# Patient Record
Sex: FEMALE | Race: WHITE | NOT HISPANIC OR LATINO | Employment: PART TIME | URBAN - METROPOLITAN AREA
[De-identification: names, ages, dates, MRNs, and addresses within clinical notes are randomized per-mention and may not be internally consistent; named-entity substitution may affect disease eponyms.]

---

## 2020-04-01 ENCOUNTER — HOSPITAL ENCOUNTER (INPATIENT)
Facility: MEDICAL CENTER | Age: 75
LOS: 3 days | DRG: 559 | End: 2020-04-04
Attending: EMERGENCY MEDICINE | Admitting: INTERNAL MEDICINE
Payer: MEDICARE

## 2020-04-01 ENCOUNTER — APPOINTMENT (OUTPATIENT)
Dept: RADIOLOGY | Facility: MEDICAL CENTER | Age: 75
DRG: 559 | End: 2020-04-01
Attending: INTERNAL MEDICINE
Payer: MEDICARE

## 2020-04-01 ENCOUNTER — APPOINTMENT (OUTPATIENT)
Dept: RADIOLOGY | Facility: MEDICAL CENTER | Age: 75
DRG: 559 | End: 2020-04-01
Attending: STUDENT IN AN ORGANIZED HEALTH CARE EDUCATION/TRAINING PROGRAM
Payer: MEDICARE

## 2020-04-01 ENCOUNTER — APPOINTMENT (OUTPATIENT)
Dept: RADIOLOGY | Facility: MEDICAL CENTER | Age: 75
DRG: 559 | End: 2020-04-01
Attending: EMERGENCY MEDICINE
Payer: MEDICARE

## 2020-04-01 ENCOUNTER — APPOINTMENT (OUTPATIENT)
Dept: CARDIOLOGY | Facility: MEDICAL CENTER | Age: 75
DRG: 559 | End: 2020-04-01
Attending: EMERGENCY MEDICINE
Payer: MEDICARE

## 2020-04-01 DIAGNOSIS — R55 SYNCOPE: ICD-10-CM

## 2020-04-01 DIAGNOSIS — I26.99 BILATERAL PULMONARY EMBOLISM (HCC): ICD-10-CM

## 2020-04-01 PROBLEM — Z96.642 HISTORY OF TOTAL LEFT HIP ARTHROPLASTY: Status: ACTIVE | Noted: 2020-04-01

## 2020-04-01 PROBLEM — I21.4 NSTEMI (NON-ST ELEVATED MYOCARDIAL INFARCTION) (HCC): Status: ACTIVE | Noted: 2020-04-01

## 2020-04-01 PROBLEM — J96.01 ACUTE RESPIRATORY FAILURE WITH HYPOXIA (HCC): Status: ACTIVE | Noted: 2020-04-01

## 2020-04-01 LAB
ALBUMIN SERPL BCP-MCNC: 4.3 G/DL (ref 3.2–4.9)
ALBUMIN/GLOB SERPL: 1.3 G/DL
ALP SERPL-CCNC: 171 U/L (ref 30–99)
ALT SERPL-CCNC: 7 U/L (ref 2–50)
ANION GAP SERPL CALC-SCNC: 14 MMOL/L (ref 7–16)
APTT PPP: 25.2 SEC (ref 24.7–36)
APTT PPP: 29.7 SEC (ref 24.7–36)
AST SERPL-CCNC: 12 U/L (ref 12–45)
BASOPHILS # BLD AUTO: 0.5 % (ref 0–1.8)
BASOPHILS # BLD: 0.05 K/UL (ref 0–0.12)
BILIRUB SERPL-MCNC: 0.6 MG/DL (ref 0.1–1.5)
BUN SERPL-MCNC: 18 MG/DL (ref 8–22)
CALCIUM SERPL-MCNC: 9.3 MG/DL (ref 8.5–10.5)
CHLORIDE SERPL-SCNC: 102 MMOL/L (ref 96–112)
CK SERPL-CCNC: 49 U/L (ref 0–154)
CO2 SERPL-SCNC: 23 MMOL/L (ref 20–33)
CREAT SERPL-MCNC: 0.67 MG/DL (ref 0.5–1.4)
EKG IMPRESSION: NORMAL
EOSINOPHIL # BLD AUTO: 0.2 K/UL (ref 0–0.51)
EOSINOPHIL NFR BLD: 2 % (ref 0–6.9)
ERYTHROCYTE [DISTWIDTH] IN BLOOD BY AUTOMATED COUNT: 42.9 FL (ref 35.9–50)
GLOBULIN SER CALC-MCNC: 3.3 G/DL (ref 1.9–3.5)
GLUCOSE SERPL-MCNC: 123 MG/DL (ref 65–99)
HCT VFR BLD AUTO: 39.8 % (ref 37–47)
HGB BLD-MCNC: 12.8 G/DL (ref 12–16)
IMM GRANULOCYTES # BLD AUTO: 0.04 K/UL (ref 0–0.11)
IMM GRANULOCYTES NFR BLD AUTO: 0.4 % (ref 0–0.9)
INR PPP: 1.07 (ref 0.87–1.13)
LV EJECT FRACT  99904: 65
LV EJECT FRACT MOD 2C 99903: 62.73
LV EJECT FRACT MOD 4C 99902: 73.56
LV EJECT FRACT MOD BP 99901: 71.47
LYMPHOCYTES # BLD AUTO: 1.99 K/UL (ref 1–4.8)
LYMPHOCYTES NFR BLD: 19.7 % (ref 22–41)
MCH RBC QN AUTO: 27.5 PG (ref 27–33)
MCHC RBC AUTO-ENTMCNC: 32.2 G/DL (ref 33.6–35)
MCV RBC AUTO: 85.6 FL (ref 81.4–97.8)
MONOCYTES # BLD AUTO: 0.72 K/UL (ref 0–0.85)
MONOCYTES NFR BLD AUTO: 7.1 % (ref 0–13.4)
NEUTROPHILS # BLD AUTO: 7.1 K/UL (ref 2–7.15)
NEUTROPHILS NFR BLD: 70.3 % (ref 44–72)
NRBC # BLD AUTO: 0 K/UL
NRBC BLD-RTO: 0 /100 WBC
NT-PROBNP SERPL IA-MCNC: 125 PG/ML (ref 0–125)
PLATELET # BLD AUTO: 156 K/UL (ref 164–446)
PMV BLD AUTO: 10.9 FL (ref 9–12.9)
POTASSIUM SERPL-SCNC: 4.3 MMOL/L (ref 3.6–5.5)
PROT SERPL-MCNC: 7.6 G/DL (ref 6–8.2)
PROTHROMBIN TIME: 14.1 SEC (ref 12–14.6)
RBC # BLD AUTO: 4.65 M/UL (ref 4.2–5.4)
SODIUM SERPL-SCNC: 139 MMOL/L (ref 135–145)
TROPONIN T SERPL-MCNC: 188 NG/L (ref 6–19)
WBC # BLD AUTO: 10.1 K/UL (ref 4.8–10.8)

## 2020-04-01 PROCEDURE — 700111 HCHG RX REV CODE 636 W/ 250 OVERRIDE (IP): Performed by: EMERGENCY MEDICINE

## 2020-04-01 PROCEDURE — 93306 TTE W/DOPPLER COMPLETE: CPT | Mod: 26 | Performed by: INTERNAL MEDICINE

## 2020-04-01 PROCEDURE — 82550 ASSAY OF CK (CPK): CPT

## 2020-04-01 PROCEDURE — 700105 HCHG RX REV CODE 258: Performed by: STUDENT IN AN ORGANIZED HEALTH CARE EDUCATION/TRAINING PROGRAM

## 2020-04-01 PROCEDURE — 93970 EXTREMITY STUDY: CPT | Mod: 26 | Performed by: INTERNAL MEDICINE

## 2020-04-01 PROCEDURE — 90715 TDAP VACCINE 7 YRS/> IM: CPT | Performed by: EMERGENCY MEDICINE

## 2020-04-01 PROCEDURE — 99153 MOD SED SAME PHYS/QHP EA: CPT

## 2020-04-01 PROCEDURE — 85025 COMPLETE CBC W/AUTO DIFF WBC: CPT

## 2020-04-01 PROCEDURE — 3E0234Z INTRODUCTION OF SERUM, TOXOID AND VACCINE INTO MUSCLE, PERCUTANEOUS APPROACH: ICD-10-PCS | Performed by: EMERGENCY MEDICINE

## 2020-04-01 PROCEDURE — 93005 ELECTROCARDIOGRAM TRACING: CPT | Performed by: EMERGENCY MEDICINE

## 2020-04-01 PROCEDURE — 700117 HCHG RX CONTRAST REV CODE 255: Performed by: EMERGENCY MEDICINE

## 2020-04-01 PROCEDURE — A9270 NON-COVERED ITEM OR SERVICE: HCPCS | Performed by: STUDENT IN AN ORGANIZED HEALTH CARE EDUCATION/TRAINING PROGRAM

## 2020-04-01 PROCEDURE — B31T1ZZ FLUOROSCOPY OF LEFT PULMONARY ARTERY USING LOW OSMOLAR CONTRAST: ICD-10-PCS | Performed by: RADIOLOGY

## 2020-04-01 PROCEDURE — 70450 CT HEAD/BRAIN W/O DYE: CPT

## 2020-04-01 PROCEDURE — B31S1ZZ FLUOROSCOPY OF RIGHT PULMONARY ARTERY USING LOW OSMOLAR CONTRAST: ICD-10-PCS | Performed by: RADIOLOGY

## 2020-04-01 PROCEDURE — 770022 HCHG ROOM/CARE - ICU (200)

## 2020-04-01 PROCEDURE — 93306 TTE W/DOPPLER COMPLETE: CPT

## 2020-04-01 PROCEDURE — 700105 HCHG RX REV CODE 258: Performed by: RADIOLOGY

## 2020-04-01 PROCEDURE — 85730 THROMBOPLASTIN TIME PARTIAL: CPT

## 2020-04-01 PROCEDURE — 71275 CT ANGIOGRAPHY CHEST: CPT

## 2020-04-01 PROCEDURE — 80053 COMPREHEN METABOLIC PANEL: CPT

## 2020-04-01 PROCEDURE — 700111 HCHG RX REV CODE 636 W/ 250 OVERRIDE (IP)

## 2020-04-01 PROCEDURE — 99291 CRITICAL CARE FIRST HOUR: CPT | Performed by: INTERNAL MEDICINE

## 2020-04-01 PROCEDURE — 90471 IMMUNIZATION ADMIN: CPT

## 2020-04-01 PROCEDURE — 76937 US GUIDE VASCULAR ACCESS: CPT

## 2020-04-01 PROCEDURE — 306396 CUSHION, WAFFLE ADULT 17X17: Performed by: INTERNAL MEDICINE

## 2020-04-01 PROCEDURE — 84484 ASSAY OF TROPONIN QUANT: CPT

## 2020-04-01 PROCEDURE — 700102 HCHG RX REV CODE 250 W/ 637 OVERRIDE(OP): Performed by: STUDENT IN AN ORGANIZED HEALTH CARE EDUCATION/TRAINING PROGRAM

## 2020-04-01 PROCEDURE — 36015 PLACE CATHETER IN ARTERY: CPT

## 2020-04-01 PROCEDURE — 85610 PROTHROMBIN TIME: CPT

## 2020-04-01 PROCEDURE — 93005 ELECTROCARDIOGRAM TRACING: CPT

## 2020-04-01 PROCEDURE — 700111 HCHG RX REV CODE 636 W/ 250 OVERRIDE (IP): Performed by: INTERNAL MEDICINE

## 2020-04-01 PROCEDURE — 700111 HCHG RX REV CODE 636 W/ 250 OVERRIDE (IP): Performed by: STUDENT IN AN ORGANIZED HEALTH CARE EDUCATION/TRAINING PROGRAM

## 2020-04-01 PROCEDURE — 700111 HCHG RX REV CODE 636 W/ 250 OVERRIDE (IP): Mod: JG | Performed by: RADIOLOGY

## 2020-04-01 PROCEDURE — 3E06317 INTRODUCTION OF OTHER THROMBOLYTIC INTO CENTRAL ARTERY, PERCUTANEOUS APPROACH: ICD-10-PCS | Performed by: RADIOLOGY

## 2020-04-01 PROCEDURE — 93970 EXTREMITY STUDY: CPT

## 2020-04-01 PROCEDURE — 6A750Z7 ULTRASOUND THERAPY OF OTHER VESSELS, SINGLE: ICD-10-PCS | Performed by: RADIOLOGY

## 2020-04-01 PROCEDURE — 99291 CRITICAL CARE FIRST HOUR: CPT

## 2020-04-01 PROCEDURE — 83880 ASSAY OF NATRIURETIC PEPTIDE: CPT

## 2020-04-01 RX ORDER — POLYETHYLENE GLYCOL 3350 17 G/17G
1 POWDER, FOR SOLUTION ORAL
Status: DISCONTINUED | OUTPATIENT
Start: 2020-04-01 | End: 2020-04-04 | Stop reason: HOSPADM

## 2020-04-01 RX ORDER — SODIUM CHLORIDE 9 MG/ML
INJECTION, SOLUTION INTRAVENOUS CONTINUOUS
Status: ACTIVE | OUTPATIENT
Start: 2020-04-01 | End: 2020-04-01

## 2020-04-01 RX ORDER — HEPARIN SODIUM,PORCINE 1000/ML
VIAL (ML) INJECTION
Status: COMPLETED
Start: 2020-04-01 | End: 2020-04-01

## 2020-04-01 RX ORDER — MIDAZOLAM HYDROCHLORIDE 1 MG/ML
.5-2 INJECTION INTRAMUSCULAR; INTRAVENOUS PRN
Status: ACTIVE | OUTPATIENT
Start: 2020-04-01 | End: 2020-04-01

## 2020-04-01 RX ORDER — OXYCODONE AND ACETAMINOPHEN 10; 325 MG/1; MG/1
TABLET ORAL
COMMUNITY
End: 2020-04-01

## 2020-04-01 RX ORDER — TRAMADOL HYDROCHLORIDE 50 MG/1
50 TABLET ORAL EVERY 6 HOURS PRN
COMMUNITY
Start: 2020-03-11

## 2020-04-01 RX ORDER — SODIUM CHLORIDE, SODIUM LACTATE, POTASSIUM CHLORIDE, CALCIUM CHLORIDE 600; 310; 30; 20 MG/100ML; MG/100ML; MG/100ML; MG/100ML
INJECTION, SOLUTION INTRAVENOUS CONTINUOUS
Status: DISCONTINUED | OUTPATIENT
Start: 2020-04-01 | End: 2020-04-04 | Stop reason: HOSPADM

## 2020-04-01 RX ORDER — MIDAZOLAM HYDROCHLORIDE 1 MG/ML
INJECTION INTRAMUSCULAR; INTRAVENOUS
Status: COMPLETED
Start: 2020-04-01 | End: 2020-04-01

## 2020-04-01 RX ORDER — HEPARIN SODIUM 5000 [USP'U]/100ML
INJECTION, SOLUTION INTRAVENOUS CONTINUOUS
Status: DISCONTINUED | OUTPATIENT
Start: 2020-04-01 | End: 2020-04-01

## 2020-04-01 RX ORDER — BISACODYL 10 MG
10 SUPPOSITORY, RECTAL RECTAL
Status: DISCONTINUED | OUTPATIENT
Start: 2020-04-01 | End: 2020-04-04 | Stop reason: HOSPADM

## 2020-04-01 RX ORDER — HEPARIN SODIUM 5000 [USP'U]/100ML
500 INJECTION, SOLUTION INTRAVENOUS CONTINUOUS
Status: ACTIVE | OUTPATIENT
Start: 2020-04-01 | End: 2020-04-01

## 2020-04-01 RX ORDER — AMOXICILLIN 250 MG
2 CAPSULE ORAL 2 TIMES DAILY
Status: DISCONTINUED | OUTPATIENT
Start: 2020-04-01 | End: 2020-04-04 | Stop reason: HOSPADM

## 2020-04-01 RX ORDER — HEPARIN SODIUM 5000 [USP'U]/100ML
INJECTION, SOLUTION INTRAVENOUS
Status: COMPLETED
Start: 2020-04-01 | End: 2020-04-01

## 2020-04-01 RX ORDER — ONDANSETRON 2 MG/ML
4 INJECTION INTRAMUSCULAR; INTRAVENOUS PRN
Status: ACTIVE | OUTPATIENT
Start: 2020-04-01 | End: 2020-04-01

## 2020-04-01 RX ORDER — MORPHINE SULFATE 4 MG/ML
1-3 INJECTION, SOLUTION INTRAMUSCULAR; INTRAVENOUS EVERY 4 HOURS PRN
Status: DISCONTINUED | OUTPATIENT
Start: 2020-04-01 | End: 2020-04-02

## 2020-04-01 RX ORDER — LABETALOL HYDROCHLORIDE 5 MG/ML
10 INJECTION, SOLUTION INTRAVENOUS EVERY 4 HOURS PRN
Status: DISCONTINUED | OUTPATIENT
Start: 2020-04-01 | End: 2020-04-04 | Stop reason: HOSPADM

## 2020-04-01 RX ORDER — HEPARIN SODIUM 5000 [USP'U]/100ML
INJECTION, SOLUTION INTRAVENOUS CONTINUOUS
Status: DISCONTINUED | OUTPATIENT
Start: 2020-04-01 | End: 2020-04-03

## 2020-04-01 RX ORDER — SODIUM CHLORIDE 9 MG/ML
500 INJECTION, SOLUTION INTRAVENOUS
Status: ACTIVE | OUTPATIENT
Start: 2020-04-01 | End: 2020-04-01

## 2020-04-01 RX ORDER — ACETAMINOPHEN 325 MG/1
650 TABLET ORAL EVERY 6 HOURS PRN
Status: DISCONTINUED | OUTPATIENT
Start: 2020-04-01 | End: 2020-04-04 | Stop reason: HOSPADM

## 2020-04-01 RX ORDER — HEPARIN SODIUM 1000 [USP'U]/ML
2600 INJECTION, SOLUTION INTRAVENOUS; SUBCUTANEOUS PRN
Status: DISCONTINUED | OUTPATIENT
Start: 2020-04-01 | End: 2020-04-01

## 2020-04-01 RX ORDER — TIZANIDINE 2 MG/1
TABLET ORAL
COMMUNITY
Start: 2020-03-11

## 2020-04-01 RX ORDER — HEPARIN SODIUM 1000 [USP'U]/ML
2600 INJECTION, SOLUTION INTRAVENOUS; SUBCUTANEOUS PRN
Status: DISCONTINUED | OUTPATIENT
Start: 2020-04-01 | End: 2020-04-03

## 2020-04-01 RX ADMIN — ACETAMINOPHEN 650 MG: 325 TABLET, FILM COATED ORAL at 18:43

## 2020-04-01 RX ADMIN — CLOSTRIDIUM TETANI TOXOID ANTIGEN (FORMALDEHYDE INACTIVATED), CORYNEBACTERIUM DIPHTHERIAE TOXOID ANTIGEN (FORMALDEHYDE INACTIVATED), BORDETELLA PERTUSSIS TOXOID ANTIGEN (GLUTARALDEHYDE INACTIVATED), BORDETELLA PERTUSSIS FILAMENTOUS HEMAGGLUTININ ANTIGEN (FORMALDEHYDE INACTIVATED), BORDETELLA PERTUSSIS PERTACTIN ANTIGEN, AND BORDETELLA PERTUSSIS FIMBRIAE 2/3 ANTIGEN 0.5 ML: 5; 2; 2.5; 5; 3; 5 INJECTION, SUSPENSION INTRAMUSCULAR at 12:30

## 2020-04-01 RX ADMIN — MIDAZOLAM HYDROCHLORIDE 1 MG: 1 INJECTION INTRAMUSCULAR; INTRAVENOUS at 17:06

## 2020-04-01 RX ADMIN — FENTANYL CITRATE 50 MCG: 50 INJECTION, SOLUTION INTRAMUSCULAR; INTRAVENOUS at 17:06

## 2020-04-01 RX ADMIN — ALTEPLASE 1 MG/HR: KIT at 18:12

## 2020-04-01 RX ADMIN — SODIUM CHLORIDE: 9 INJECTION, SOLUTION INTRAVENOUS at 18:12

## 2020-04-01 RX ADMIN — SODIUM CHLORIDE: 9 INJECTION, SOLUTION INTRAVENOUS at 18:11

## 2020-04-01 RX ADMIN — HEPARIN SODIUM: 1000 INJECTION, SOLUTION INTRAVENOUS; SUBCUTANEOUS at 17:16

## 2020-04-01 RX ADMIN — MORPHINE SULFATE 3 MG: 4 INJECTION INTRAVENOUS at 20:07

## 2020-04-01 RX ADMIN — MIDAZOLAM HYDROCHLORIDE 1 MG: 1 INJECTION, SOLUTION INTRAMUSCULAR; INTRAVENOUS at 17:06

## 2020-04-01 RX ADMIN — IOHEXOL 65 ML: 350 INJECTION, SOLUTION INTRAVENOUS at 15:02

## 2020-04-01 RX ADMIN — HEPARIN SODIUM 500 UNITS/HR: 5000 INJECTION, SOLUTION INTRAVENOUS at 18:13

## 2020-04-01 RX ADMIN — HEPARIN SODIUM 1050 UNITS/HR: 5000 INJECTION, SOLUTION INTRAVENOUS at 22:46

## 2020-04-01 RX ADMIN — SODIUM CHLORIDE, POTASSIUM CHLORIDE, SODIUM LACTATE AND CALCIUM CHLORIDE: 600; 310; 30; 20 INJECTION, SOLUTION INTRAVENOUS at 20:00

## 2020-04-01 ASSESSMENT — ENCOUNTER SYMPTOMS
PALPITATIONS: 0
HEADACHES: 0
VOMITING: 0
SPUTUM PRODUCTION: 0
BRUISES/BLEEDS EASILY: 0
EYE REDNESS: 0
DEPRESSION: 0
BLURRED VISION: 0
SEIZURES: 0
COUGH: 0
SHORTNESS OF BREATH: 0
SORE THROAT: 0
FALLS: 1
CHILLS: 0
FEVER: 0
DIZZINESS: 1
HEMOPTYSIS: 0
HEARTBURN: 0
ABDOMINAL PAIN: 0
FOCAL WEAKNESS: 0
NAUSEA: 0

## 2020-04-01 ASSESSMENT — COPD QUESTIONNAIRES
COPD SCREENING SCORE: 4
DO YOU EVER COUGH UP ANY MUCUS OR PHLEGM?: NO/ONLY WITH OCCASIONAL COLDS OR INFECTIONS
HAVE YOU SMOKED AT LEAST 100 CIGARETTES IN YOUR ENTIRE LIFE: YES
DURING THE PAST 4 WEEKS HOW MUCH DID YOU FEEL SHORT OF BREATH: NONE/LITTLE OF THE TIME

## 2020-04-01 ASSESSMENT — LIFESTYLE VARIABLES: EVER_SMOKED: YES

## 2020-04-01 ASSESSMENT — FIBROSIS 4 INDEX: FIB4 SCORE: 2.12

## 2020-04-01 NOTE — ED NOTES
ERP aware of pts cta and reports that she spoke to the resident regarding heparin and thrombolytics. Awaiting further orders

## 2020-04-01 NOTE — ED PROVIDER NOTES
ED Provider Note    CHIEF COMPLAINT  Chief Complaint   Patient presents with   • Syncope   • Head Laceration   • Dizziness       HPI  Lu Sands is a 75 y.o. female who presents complaining of syncope.  The patient states that she was shopping at [employer] and on her way out of the store she felt briefly dizzy and woke up after loss of consciousness of unknown time.  Patient complains of headache and feels confused.      She denies nausea, vomiting, neck pain, other injuries, numbness, weakness, visual changes.  She reports difficulty breathing, fever, chills, leg swelling, calf pain.  She also denies preceding chest pain, headache, and palpitations.    Patient denies anticoagulation therapy and aspirin therapy.    Patient states she had a hip replacement on 1/20/2020.  She has been having some back pain since then and had a recent MRI.    Per the RN, EMS reported severe hypoxia at the scene.  The patient dipped down to 87% when she was taken off oxygen and transferred onto the hospital stretcher.      ALLERGIES  No Known Allergies    CURRENT MEDICATIONS  Home Medications     Reviewed by Idalia Andrew on 04/01/20 at 1332  Med List Status: Complete   Medication Last Dose Status   tizanidine (ZANAFLEX) 2 MG tablet UNK Active   tramadol (ULTRAM) 50 MG Tab UNK Active                PAST MEDICAL HISTORY   has a past medical history of Abnormal CXR (8/12/2011), Cataracts, bilateral, Cutaneous horn (5/18/2012), Fx intertrochanteric hip (HCC), H/O squamous cell carcinoma of skin (6/4/2012), History of fracture (8/12/2011), Squamous cell skin cancer, thigh (6/4/2012), and Vitamin d deficiency (8/13/2011).    SURGICAL HISTORY   has a past surgical history that includes hip nailing intramedullary (6/17/2010) and cataract extraction with iol.    SOCIAL HISTORY  Social History     Tobacco Use   • Smoking status: Never Smoker   • Smokeless tobacco: Never Used   Substance and Sexual Activity   • Alcohol use: No     Comment: Rare  "  • Drug use: No   • Sexual activity: Not on file       Family Hx:  No family history of PE/DVT, cerebral aneurysms, TAD, or ACS      REVIEW OF SYSTEMS  See HPI for further details.  All other systems are negative except as above in HPI.    PHYSICAL EXAM  VITAL SIGNS: /81   Pulse (!) 116   Temp 36.6 °C (97.8 °F) (Temporal)   Resp (!) 26   Ht 1.702 m (5' 7\")   Wt 78.5 kg (173 lb)   LMP 08/05/1989   SpO2 95%   Breastfeeding No   BMI 27.10 kg/m²     General:  WDWN, nontoxic appearing in NAD; A+Ox3; V/S as above; tachycardic, hypoxic on room air, 93% on 2 L of O2  Skin: warm and dry; good color; no rash  HEENT: NC; dried blood over right occipto-parietal region; no active bleeding or bony depression; EOMs intact; PERRL; no scleral icterus   Neck: FROM; no midline tenderness or step-offs  Cardiovascular: Tachycardic heart rate and rhythm.  No murmurs, rubs, or gallops; pulses 2+ bilaterally radially and DP areas  Lungs: Clear to auscultation with good air movement bilaterally.  No wheezes, rhonchi, or rales.   Abdomen: BS present; soft; NTND; no rebound, guarding, or rigidity.  No organomegaly or pulsatile mass  Extremities: BOWLES x 4; no e/o trauma; no pedal edema; neg Roseanne's  Neurologic: CNs III-XII grossly intact; speech clear; distal sensation intact; strength 5/5 UE/LEs  Psychiatric: Appropriate affect, normal mood    LABS  Results for orders placed or performed during the hospital encounter of 04/01/20   EC-ECHOCARDIOGRAM COMPLETE W/O CONT   Result Value Ref Range    Eject.Frac. MOD BP 71.47     Eject.Frac. MOD 4C 73.56     Eject.Frac. MOD 2C 62.73     Left Ventrical Ejection Fraction 65    CBC WITH DIFFERENTIAL   Result Value Ref Range    WBC 10.1 4.8 - 10.8 K/uL    RBC 4.65 4.20 - 5.40 M/uL    Hemoglobin 12.8 12.0 - 16.0 g/dL    Hematocrit 39.8 37.0 - 47.0 %    MCV 85.6 81.4 - 97.8 fL    MCH 27.5 27.0 - 33.0 pg    MCHC 32.2 (L) 33.6 - 35.0 g/dL    RDW 42.9 35.9 - 50.0 fL    Platelet Count 156 (L) " 164 - 446 K/uL    MPV 10.9 9.0 - 12.9 fL    Neutrophils-Polys 70.30 44.00 - 72.00 %    Lymphocytes 19.70 (L) 22.00 - 41.00 %    Monocytes 7.10 0.00 - 13.40 %    Eosinophils 2.00 0.00 - 6.90 %    Basophils 0.50 0.00 - 1.80 %    Immature Granulocytes 0.40 0.00 - 0.90 %    Nucleated RBC 0.00 /100 WBC    Neutrophils (Absolute) 7.10 2.00 - 7.15 K/uL    Lymphs (Absolute) 1.99 1.00 - 4.80 K/uL    Monos (Absolute) 0.72 0.00 - 0.85 K/uL    Eos (Absolute) 0.20 0.00 - 0.51 K/uL    Baso (Absolute) 0.05 0.00 - 0.12 K/uL    Immature Granulocytes (abs) 0.04 0.00 - 0.11 K/uL    NRBC (Absolute) 0.00 K/uL   Prothrombin Time   Result Value Ref Range    PT 14.1 12.0 - 14.6 sec    INR 1.07 0.87 - 1.13   Comp Metabolic Panel   Result Value Ref Range    Sodium 139 135 - 145 mmol/L    Potassium 4.3 3.6 - 5.5 mmol/L    Chloride 102 96 - 112 mmol/L    Co2 23 20 - 33 mmol/L    Anion Gap 14.0 7.0 - 16.0    Glucose 123 (H) 65 - 99 mg/dL    Bun 18 8 - 22 mg/dL    Creatinine 0.67 0.50 - 1.40 mg/dL    Calcium 9.3 8.5 - 10.5 mg/dL    AST(SGOT) 12 12 - 45 U/L    ALT(SGPT) 7 2 - 50 U/L    Alkaline Phosphatase 171 (H) 30 - 99 U/L    Total Bilirubin 0.6 0.1 - 1.5 mg/dL    Albumin 4.3 3.2 - 4.9 g/dL    Total Protein 7.6 6.0 - 8.2 g/dL    Globulin 3.3 1.9 - 3.5 g/dL    A-G Ratio 1.3 g/dL   TROPONIN   Result Value Ref Range    Troponin T 188 (H) 6 - 19 ng/L   CREATINE KINASE   Result Value Ref Range    CPK Total 49 0 - 154 U/L   ESTIMATED GFR   Result Value Ref Range    GFR If African American >60 >60 mL/min/1.73 m 2    GFR If Non African American >60 >60 mL/min/1.73 m 2   PTT   Result Value Ref Range    APTT 25.2 24.7 - 36.0 sec   proBrain Natriuretic Peptide, NT   Result Value Ref Range    NT-proBNP 125 0 - 125 pg/mL   EKG   Result Value Ref Range    Report       [facility] Emergency Dept.    Test Date:  2020-04-01  Pt Name:    TOO SAEED                   Department: ER  MRN:        [id number]                      Room:         20  Gender:     Female                       Technician: [id number]  :        [id number]                   Requested By:ER TRIAGE PROTOCOL  Order #:    [id number]                    Reading MD: JANETH UNGER MD    Measurements  Intervals                                Axis  Rate:       106                          P:          78  MA:         140                          QRS:        -16  QRSD:       122                          T:          18  QT:         336  QTc:        447    Interpretive Statements  SINUS TACHYCARDIA  CONSIDER RIGHT ATRIAL ABNORMALITY  RBBB AND LAFB  BASELINE WANDER IN LEAD(S) V4  Compared to ECG 2010 11:31:29  Left anterior fascicular block now present  Right bundle-branch block now present  Sinus rhythm no longer present  Electronically Signed On 2020 12:16:16 PDT by CROW UNGER MD         IMAGING  US-EXTREMITY VENOUS LOWER BILAT   Final Result      EC-ECHOCARDIOGRAM COMPLETE W/O CONT   Final Result      CT-CTA CHEST PULMONARY ARTERY W/ RECONS   Final Result         1. Extensive bilateral pulmonary emboli extending to the main pulmonary artery. No saddle embolus. No elevation of the RV/LV ratio.      2. No airspace opacity. No pleural effusion. No pneumothorax.      3. Cholelithiasis.      4. Heterogeneous sclerotic L1 vertebral body. Partially visualized sclerotic L3 vertebral body with some compression deformity. These are nonspecific and could be seen with Paget's disease. Metastasis cannot be entirely excluded. Further evaluation with    MRI lumbar spine and bone scan can be helpful.      CT-HEAD W/O   Final Result      1.  No evidence of acute intracranial process.      2.  Cerebral atrophy as well as periventricular chronic small vessel ischemic change.      IR-PULMONARY ANGIOGRAM-BILATERAL    (Results Pending)     MEDICAL RECORD  I have reviewed patient's medical record and pertinent results are listed below.      COURSE & MEDICAL DECISION MAKING  I have reviewed any  medical record information, laboratory studies and radiographic results as noted.    Lu Sands is a 75 y.o. female who presents complaining of syncope.  Patient is slightly confused and has a head laceration.  CT head was ordered.  Patient was also hypoxic at the scene, is currently tachycardic, and has had recent orthopedic surgery.  CTA PE study was also ordered.    EKG demonstrates sinus tachycardia without arrhythmia or ST changes.    Troponin resulted at 188.    Awaiting CT head results before treating the patient for presumptive PE.    2:17 PM  Paging cardiology for stat echo given high suspicion for PE and troponin of 188.    Jamie approved stat echo    2:53 PM  Paging [facility] ICU team and critical care attending.    I discussed the case with Dr. Dennie from critical care as well as Dr. Foley, the [facility] ICU resident.  I advised him of my high clinical suspicion for PE.  They agree to admit the patient.    I contacted Dr. Dennie via [place] text and he is aware the patient has bilateral extensive PEs.  Echo is pending.  Patient will be admitted to the ICU with plan for IR to treat with EKOS.      The total critical care time on this patient is 40 minutes, resuscitating patient, speaking with admitting physician, and deciphering test results. This 40 minutes is exclusive of separately billable procedures.      FINAL IMPRESSION  1. Syncope     2. Bilateral pulmonary embolism (HCC)         Electronically signed by: Sherlyn Garcia M.D., 4/1/2020 11:57 AM

## 2020-04-01 NOTE — H&P
History & Physical Note    Date of Admission: 4/1/2020  Admission Status: Inpatient  [facility] Team: [facility] ICU Gold Team  Attending: Dr. Maverick Li  Senior Resident: Dr. HENDERSON  Contact Number: [phone]    Chief Complaint:  Syncope  Pulmonary embolism    History of Present Illness (HPI):  Lu is a 75 y.o. female who presented 4/1/2020 with syncope.  It was sudden onset she was walking from [facility] to her car, she felt all of a sudden lightheaded and then she does know what happened next.  Brought in by EMS, noted to be hypoxic satting in the 70s.  This is never happened before.  Denies chest pain, shortness of breath, cough, fevers chills, leg swelling.  She does have a history of left hip replacement about 10 weeks ago.  She is a non-smoker.  Does not take any blood thinners.  She did hit her head, and has some tenderness to palpation there.    Work-up in the emergency department showed positive bilateral pulmonary embolisms, ultrasound of the left lower extremity showed acute DVT.  Troponin I 88, BNP pending.  EKG showed bundle pattern left and right.  Echocardiogram showed right heart strain.  CT of the head was negative for acute bleed.    Review of Systems:   Review of Systems   Constitutional: Negative for chills and fever.   HENT: Negative for hearing loss and sore throat.    Eyes: Negative for blurred vision and redness.   Respiratory: Negative for cough and shortness of breath.    Cardiovascular: Negative for chest pain, palpitations and leg swelling.   Gastrointestinal: Negative for abdominal pain, nausea and vomiting.   Genitourinary: Negative for dysuria and urgency.   Musculoskeletal: Positive for falls. Negative for joint pain.   Skin: Negative for itching and rash.   Neurological: Positive for dizziness. Negative for headaches.   Endo/Heme/Allergies: Negative for environmental allergies. Does not bruise/bleed easily.   Psychiatric/Behavioral: Negative for depression and suicidal ideas.         Past  Medical History:   Past Medical History was reviewed with patient.   has a past medical history of Abnormal CXR (8/12/2011), Cataracts, bilateral, Cutaneous horn (5/18/2012), Fx intertrochanteric hip (HCC), H/O squamous cell carcinoma of skin (6/4/2012), History of fracture (8/12/2011), Squamous cell skin cancer, thigh (6/4/2012), and Vitamin d deficiency (8/13/2011).    Past Surgical History: Past Surgical History was reviewed with patient.   has a past surgical history that includes hip nailing intramedullary (6/17/2010) and cataract extraction with iol.    Medications: Medications have been reviewed with patient.  Prior to Admission Medications   Prescriptions Last Dose Informant Patient Reported? Taking?   tizanidine (ZANAFLEX) 2 MG tablet UNK at [facility] Patient's Home Pharmacy Yes No   Sig: TAKE 1 TABLET BY MOUTH EVERY DAY AT BEDTIME AS NEEDED FOR MUSCLE SPASMS   tramadol (ULTRAM) 50 MG Tab UNK at [facility] Patient's Home Pharmacy Yes No   Sig: Take 50 mg by mouth every 6 hours as needed.  FOR SEVERE PAIN      Facility-Administered Medications: None        Allergies: Allergies have been reviewed with patient.  No Known Allergies    Family History:   family history includes Genetic Disorder in her father.     Social History:   Non-smoker    Primary Care Provider: reviewed Shivani Torres M.D.    Physical Exam:     Vitals:  Temp:  [36.6 °C (97.8 °F)] 36.6 °C (97.8 °F)  Pulse:  [106-124] 109  Resp:  [15-24] 24  BP: (100-139)/(69-89) 131/77  SpO2:  [87 %-100 %] 91 %    Physical Exam  Vitals signs reviewed.   Constitutional:       Appearance: Normal appearance.   HENT:      Head: Normocephalic and atraumatic.      Nose: Nose normal.      Mouth/Throat:      Mouth: Mucous membranes are moist.   Eyes:      Extraocular Movements: Extraocular movements intact.   Cardiovascular:      Rate and Rhythm: Normal rate and regular rhythm.      Heart sounds: No murmur.   Pulmonary:      Effort: Pulmonary effort is normal. No respiratory  distress.      Breath sounds: Normal breath sounds. No wheezing or rales.   Abdominal:      General: Abdomen is flat. Bowel sounds are normal.      Palpations: Abdomen is soft.   Musculoskeletal: Normal range of motion.         General: No swelling.   Skin:     General: Skin is warm and dry.   Neurological:      General: No focal deficit present.      Mental Status: She is alert.   Psychiatric:         Mood and Affect: Mood normal.         Behavior: Behavior normal.         Previous Data Review: reviewed    * Acute pulmonary embolism (HCC)  Assessment & Plan  -Presented with syncope while walking, with preceding lightheadedness  -CT PE shows widespread bilateral pulmonary embolism, as well ultrasound of left lower extremity shows acute DVT indicating likely high clot burden  -Not hemodynamically compromised, however elevated troponin, EKG and echo all show evidence of right heart strain with demand ischemia  -PE was likely provoked secondary to DVT of left lower extremity after arthroplasty of the hip    Plan  -Given this is likely a submassive PE, interventional radiology was consulted in the emergency department, who recommends catheter based alteplase therapy  -Admit to ICU for close monitoring status post EKOS  -Post TPA protocol, including frequent neuro checks, vital signs  -Continue heparin protocol after EKOS  -Consider transition to oral anticoagulant after day 1    Acute respiratory failure with hypoxia ([facility])  Assessment & Plan  -Secondary to pulmonary embolism  -RT protocol  -O2 greater than 90% saturation  -Early mobilization  -Physical therapy    NSTEMI (non-ST elevated myocardial infarction) ([facility])  Assessment & Plan  -Troponin 188 on admission.  EKG did not show acute ST elevation or depression, or T wave changes.  -Likely secondary to demand ischemia of large pulmonary embolism    Syncope  Assessment & Plan  -Classic history consistent with diagnosis of pulmonary embolism  -Does not warrant secondary  work-up of syncope    History of total left hip arthroplasty  Assessment & Plan  -Contributory to DVT which probably dislodged into causing the pulmonary medicine     Quality Measures:  Feeding: NPO  Analgesia: None  Sedation: None  Thromboprophylaxis: TPA then heparin protocol  Head of bed: >30 degrees  Ulcer prophylaxis: None  Glycemic control: None  Bowel care: bowel regimen none  Indwelling lines: PIV  Deescalation of antibiotics: None

## 2020-04-01 NOTE — ASSESSMENT & PLAN NOTE
- Presented with syncope while shopping, with preceding lightheadedness.  - CT PE shows widespread bilateral pulmonary embolism, as well ultrasound of left lower extremity shows acute DVT indicating likely high clot burden  - Not hemodynamically compromised on adnission, but did have elevated troponin, EKG and echo all show evidence of right heart strain with demand ischemia  - PE was likely provoked secondary to DVT of left lower extremity after left arthroplasty of the hip  - IR did EKOS on 4/1/2020 and catheter has since been removed. Patient was stable, transferred to the floor.    Plan  - Heparin switched to Apixaban. Duration will be about 6 months.

## 2020-04-01 NOTE — ASSESSMENT & PLAN NOTE
- Classic history consistent with diagnosis of pulmonary embolism  - Does not warrant secondary work-up of syncope

## 2020-04-01 NOTE — ED NOTES
Med rec complete per pt's pharmacy.  Allergies reviewed per pharmacy.  No ABX filled in last 14 days.

## 2020-04-01 NOTE — CONSULTS
Critical Care Consultation    Date of consult: 4/1/2020    Referring Physician  Sherlyn Garcia M.D.    Reason for Consultation  Pulmonary embolism with syncope    History of Presenting Illness  75 y.o. female who presented 4/1/2020 with syncopal episode.  She has a history of recent left total replacement in January of this year.  She is relatively healthy and takes no routine home medications.  She has never had any blood clotting disorders or prior thrombotic events.  Today she was walking to her car in the [employer] [place] parking lot and felt lightheaded then lost consciousness.  She had a syncopal episode and contusion over the right frontal/occipital region.  She had had no antecedent illness and denied fevers chills cough dyspnea.  She did not have any leg pain or swelling.  Her hip is been feeling fine.  She is a lifetime non-smoker and does not drink alcohol.  She was brought into the ED and CT showed bilateral pulmonary emboli.  Troponin was elevated at 188 with EKG showing sinus tachycardia bundle branch block.  I was called to evaluate the patient for consideration of lytic therapy versus anticoagulation therapy.  Echocardiogram was performed and I reviewed this with cardiology.  She had normal LV function but acute right heart strain.  She was reportedly hypoxic at the scene with an oxygen saturation down to 70%.  In the ED she initially desaturated when taken off oxygen however now is currently normotensive and oxygen saturations are 92 to 93% on room air currently.  She is not in any respiratory distress and denies pain.  Only recent surgery was hip arthroplasty in January.  No prior history of stroke, intracranial hemorrhage or cranial surgery.  CT scan of the head was unremarkable.  She has not had any significant bleeding diatheses.    Code Status  Prior    Review of Systems  Review of Systems   Constitutional: Negative for chills and fever.   HENT: Negative for sore throat.    Eyes: Negative for blurred  vision.   Respiratory: Negative for cough, hemoptysis, sputum production and shortness of breath.    Cardiovascular: Negative for chest pain, palpitations and leg swelling.   Gastrointestinal: Negative for heartburn and nausea.   Neurological: Positive for dizziness. Negative for focal weakness and seizures.       Past Medical History   has a past medical history of Abnormal CXR (8/12/2011), Cataracts, bilateral, Cutaneous horn (5/18/2012), Fx intertrochanteric hip (HCC), H/O squamous cell carcinoma of skin (6/4/2012), History of fracture (8/12/2011), Squamous cell skin cancer, thigh (6/4/2012), and Vitamin d deficiency (8/13/2011).    Surgical History   has a past surgical history that includes hip nailing intramedullary (6/17/2010) and cataract extraction with iol.    Family History  family history includes Genetic Disorder in her father.    Social History   reports that she has never smoked. She has never used smokeless tobacco. She reports that she does not drink alcohol or use drugs.    Medications  Home Medications     Reviewed by Idalia Andrew on 04/01/20 at 1332  Med List Status: Complete   Medication Last Dose Status   tizanidine (ZANAFLEX) 2 MG tablet UNK Active   tramadol (ULTRAM) 50 MG Tab UNK Active              No current facility-administered medications for this encounter.      Current Outpatient Medications   Medication Sig Dispense Refill   • tizanidine (ZANAFLEX) 2 MG tablet TAKE 1 TABLET BY MOUTH EVERY DAY AT BEDTIME AS NEEDED FOR MUSCLE SPASMS     • tramadol (ULTRAM) 50 MG Tab Take 50 mg by mouth every 6 hours as needed.  FOR SEVERE PAIN         Allergies  No Known Allergies    Vital Signs last 24 hours  Temp:  [36.6 °C (97.8 °F)] 36.6 °C (97.8 °F)  Pulse:  [106-124] 124  Resp:  [15-22] 20  BP: (100-139)/(69-89) 139/81  SpO2:  [87 %-100 %] 94 %    Physical Exam  Physical Exam  Vitals signs and nursing note reviewed.   HENT:      Head: Normocephalic.      Comments: Raised contusion over right  frontal/occipital region     Mouth/Throat:      Mouth: Mucous membranes are moist.   Eyes:      Pupils: Pupils are equal, round, and reactive to light.   Neck:      Musculoskeletal: Neck supple.   Cardiovascular:      Rate and Rhythm: Regular rhythm. Tachycardia present.      Pulses: Normal pulses.      Heart sounds: No murmur.   Pulmonary:      Effort: Pulmonary effort is normal. No respiratory distress.      Breath sounds: No wheezing or rales.   Abdominal:      General: There is no distension.      Palpations: Abdomen is soft.      Tenderness: There is no abdominal tenderness.   Musculoskeletal: Normal range of motion.         General: No swelling.   Skin:     General: Skin is warm and dry.      Capillary Refill: Capillary refill takes less than 2 seconds.      Coloration: Skin is not jaundiced.   Neurological:      General: No focal deficit present.      Mental Status: She is alert and oriented to person, place, and time.      Cranial Nerves: No cranial nerve deficit.         Fluids  No intake or output data in the 24 hours ending 20 1606    Laboratory  Recent Results (from the past 48 hour(s))   EKG    Collection Time: 20 11:38 AM   Result Value Ref Range    Report       [facility] Emergency Dept.    Test Date:  2020  Pt Name:    TOO SAEED                   Department: ER  MRN:        [id number]                      Room:       [facility]  Gender:     Female                       Technician: [id number]  :        [id number]                   Requested By:ER TRIAGE PROTOCOL  Order #:    [id number]                    Reading MD: JANETH UNGER MD    Measurements  Intervals                                Axis  Rate:       106                          P:          78  MO:         140                          QRS:        -16  QRSD:       122                          T:          18  QT:         336  QTc:        447    Interpretive Statements  SINUS TACHYCARDIA  CONSIDER RIGHT ATRIAL  ABNORMALITY  RBBB AND LAFB  BASELINE WANDER IN LEAD(S) V4  Compared to ECG 06/17/2010 11:31:29  Left anterior fascicular block now present  Right bundle-branch block now present  Sinus rhythm no longer present  Electronically Signed On 4-1-2020 12:16:16 PDT by CROW UNGER MD     CBC WITH DIFFERENTIAL    Collection Time: 04/01/20 11:44 AM   Result Value Ref Range    WBC 10.1 4.8 - 10.8 K/uL    RBC 4.65 4.20 - 5.40 M/uL    Hemoglobin 12.8 12.0 - 16.0 g/dL    Hematocrit 39.8 37.0 - 47.0 %    MCV 85.6 81.4 - 97.8 fL    MCH 27.5 27.0 - 33.0 pg    MCHC 32.2 (L) 33.6 - 35.0 g/dL    RDW 42.9 35.9 - 50.0 fL    Platelet Count 156 (L) 164 - 446 K/uL    MPV 10.9 9.0 - 12.9 fL    Neutrophils-Polys 70.30 44.00 - 72.00 %    Lymphocytes 19.70 (L) 22.00 - 41.00 %    Monocytes 7.10 0.00 - 13.40 %    Eosinophils 2.00 0.00 - 6.90 %    Basophils 0.50 0.00 - 1.80 %    Immature Granulocytes 0.40 0.00 - 0.90 %    Nucleated RBC 0.00 /100 WBC    Neutrophils (Absolute) 7.10 2.00 - 7.15 K/uL    Lymphs (Absolute) 1.99 1.00 - 4.80 K/uL    Monos (Absolute) 0.72 0.00 - 0.85 K/uL    Eos (Absolute) 0.20 0.00 - 0.51 K/uL    Baso (Absolute) 0.05 0.00 - 0.12 K/uL    Immature Granulocytes (abs) 0.04 0.00 - 0.11 K/uL    NRBC (Absolute) 0.00 K/uL   Prothrombin Time    Collection Time: 04/01/20 11:44 AM   Result Value Ref Range    PT 14.1 12.0 - 14.6 sec    INR 1.07 0.87 - 1.13   PTT    Collection Time: 04/01/20 11:44 AM   Result Value Ref Range    APTT 25.2 24.7 - 36.0 sec   Comp Metabolic Panel    Collection Time: 04/01/20  1:15 PM   Result Value Ref Range    Sodium 139 135 - 145 mmol/L    Potassium 4.3 3.6 - 5.5 mmol/L    Chloride 102 96 - 112 mmol/L    Co2 23 20 - 33 mmol/L    Anion Gap 14.0 7.0 - 16.0    Glucose 123 (H) 65 - 99 mg/dL    Bun 18 8 - 22 mg/dL    Creatinine 0.67 0.50 - 1.40 mg/dL    Calcium 9.3 8.5 - 10.5 mg/dL    AST(SGOT) 12 12 - 45 U/L    ALT(SGPT) 7 2 - 50 U/L    Alkaline Phosphatase 171 (H) 30 - 99 U/L    Total Bilirubin 0.6  0.1 - 1.5 mg/dL    Albumin 4.3 3.2 - 4.9 g/dL    Total Protein 7.6 6.0 - 8.2 g/dL    Globulin 3.3 1.9 - 3.5 g/dL    A-G Ratio 1.3 g/dL   TROPONIN    Collection Time: 04/01/20  1:15 PM   Result Value Ref Range    Troponin T 188 (H) 6 - 19 ng/L   CREATINE KINASE    Collection Time: 04/01/20  1:15 PM   Result Value Ref Range    CPK Total 49 0 - 154 U/L   ESTIMATED GFR    Collection Time: 04/01/20  1:15 PM   Result Value Ref Range    GFR If African American >60 >60 mL/min/1.73 m 2    GFR If Non African American >60 >60 mL/min/1.73 m 2       Imaging  US-EXTREMITY VENOUS LOWER BILAT   Final Result      EC-ECHOCARDIOGRAM COMPLETE W/O CONT   Final Result      CT-CTA CHEST PULMONARY ARTERY W/ RECONS   Final Result         1. Extensive bilateral pulmonary emboli extending to the main pulmonary artery. No saddle embolus. No elevation of the RV/LV ratio.      2. No airspace opacity. No pleural effusion. No pneumothorax.      3. Cholelithiasis.      4. Heterogeneous sclerotic L1 vertebral body. Partially visualized sclerotic L3 vertebral body with some compression deformity. These are nonspecific and could be seen with Paget's disease. Metastasis cannot be entirely excluded. Further evaluation with    MRI lumbar spine and bone scan can be helpful.      CT-HEAD W/O   Final Result      1.  No evidence of acute intracranial process.      2.  Cerebral atrophy as well as periventricular chronic small vessel ischemic change.      IR-PULMONARY ANGIOGRAM-BILATERAL    (Results Pending)       Assessment/Plan  * Acute pulmonary embolism (HCC)  Assessment & Plan  Risk is recent left hip arthroplasty January 20, 2020  Extensive DVT left lower extremity without proximal mobile thrombus  Bilateral clot burden noted on CTA chest  Presented with syncope and acute RV strain on echocardiogram, currently normotensive, consistent with high risk submassive pulmonary embolism  Avoid systemic thrombolysis given fall with head injury and over 70 years  of age.  However would proceed with catheter directed thrombolytic therapy with low-dose alteplase, EKOS given significant RV strain and probable some improvement in 30-day mortality  Reviewed with IR, proceed with EKOS discussed with patient and monitor closely postop in ICU  Avoid excessive volume resuscitation  Post thrombolytic heparin protocol    Acute respiratory failure with hypoxia (HCC)  Assessment & Plan  Continue supplemental oxygen if needed, follow closely in ICU    NSTEMI (non-ST elevated myocardial infarction) (HCC)  Assessment & Plan  Secondary to acute pulmonary embolism and RV strain    Syncope  Assessment & Plan  Secondary to acute pulmonary embolism  With ground-level fall and right scalp hematoma  Follow closely with heparin therapy    History of total left hip arthroplasty  Assessment & Plan  1/20/2020      Discussed patient condition and risk of morbidity and/or mortality with RN, Pharmacy and House officer and ER physician.    The patient remains critically ill.  The patient will be admitted to the intensive care unit.  She is at high risk for further deterioration with acute RV dysfunction in the setting of large pulmonary embolism and syncopal episode.  I will titrate heparin infusion and if initiated thrombolytic protocol with catheter directed thrombolysis.  Ongoing critical care management as above.  Critical care time = 34 minutes in directly providing and coordinating critical care and extensive data review.  No time overlap and excludes procedures.

## 2020-04-01 NOTE — ED NOTES
Patient refusing ct at this time requesting to be transferred to [facility] for her insurance coverage since she is out of network. Pt also reports that she spoke to [employer], insurance, and they stated that since this is an emergent situation they'll waive the no coverage for one night in the hospital but tomorrow she would have to be transferred to [facility] where she is covered. Notified ERP and ERP to bedside to speak to pt regarding this and the importance of the ct. Pt agreeable to go to ct

## 2020-04-01 NOTE — DISCHARGE PLANNING
Attempted to call patient in her room with no luck.  Phone just went to voice mail.  Called  to discuss patient's insurance and that it is out of network  Discussed options.  He asked that she call him to also discuss.  Message relayed to RN who will ask the patient to call him.

## 2020-04-01 NOTE — ED TRIAGE NOTES
BIBA for syncopal event and dizziness after shopping at [employer]. + loc for unk length of time. No thinners with lac noted to right posterior head. Pt denies any other complaints currently besides the dizziness. Pt appears tachypniec upon arrival as well but no rr distress noted    Pt also hypoxic per ems, satting in the 70's. Pt arrives with mask in place but denies any sob/cough or travel.   Placed pt on droplet upon arrival for the hypoxia and tachycardia

## 2020-04-01 NOTE — DISCHARGE PLANNING
Spoke with MD regarding patient and possible transfer to [employer] [facility].  MD states patient is too unstable to transfer at this time, patient also brought to hospital as a trauma patient because of injury.  Transfer not pursued at this time.

## 2020-04-02 PROBLEM — G89.29 CHRONIC MIDLINE LOW BACK PAIN WITHOUT SCIATICA: Status: ACTIVE | Noted: 2020-04-02

## 2020-04-02 PROBLEM — M54.50 CHRONIC MIDLINE LOW BACK PAIN WITHOUT SCIATICA: Status: ACTIVE | Noted: 2020-04-02

## 2020-04-02 PROBLEM — S00.03XA TRAUMATIC HEMATOMA OF SCALP: Status: ACTIVE | Noted: 2020-04-02

## 2020-04-02 LAB
ANION GAP SERPL CALC-SCNC: 14 MMOL/L (ref 7–16)
APTT PPP: 102.3 SEC (ref 24.7–36)
APTT PPP: 74.4 SEC (ref 24.7–36)
APTT PPP: 98.3 SEC (ref 24.7–36)
BASOPHILS # BLD AUTO: 0.4 % (ref 0–1.8)
BASOPHILS # BLD: 0.03 K/UL (ref 0–0.12)
BUN SERPL-MCNC: 15 MG/DL (ref 8–22)
CALCIUM SERPL-MCNC: 8.8 MG/DL (ref 8.5–10.5)
CHLORIDE SERPL-SCNC: 101 MMOL/L (ref 96–112)
CO2 SERPL-SCNC: 19 MMOL/L (ref 20–33)
CREAT SERPL-MCNC: 0.46 MG/DL (ref 0.5–1.4)
EOSINOPHIL # BLD AUTO: 0 K/UL (ref 0–0.51)
EOSINOPHIL NFR BLD: 0 % (ref 0–6.9)
ERYTHROCYTE [DISTWIDTH] IN BLOOD BY AUTOMATED COUNT: 42.5 FL (ref 35.9–50)
ERYTHROCYTE [DISTWIDTH] IN BLOOD BY AUTOMATED COUNT: 43.5 FL (ref 35.9–50)
GLUCOSE SERPL-MCNC: 116 MG/DL (ref 65–99)
HCT VFR BLD AUTO: 34.3 % (ref 37–47)
HCT VFR BLD AUTO: 34.7 % (ref 37–47)
HGB BLD-MCNC: 11.2 G/DL (ref 12–16)
HGB BLD-MCNC: 11.2 G/DL (ref 12–16)
IMM GRANULOCYTES # BLD AUTO: 0.03 K/UL (ref 0–0.11)
IMM GRANULOCYTES NFR BLD AUTO: 0.4 % (ref 0–0.9)
LYMPHOCYTES # BLD AUTO: 0.97 K/UL (ref 1–4.8)
LYMPHOCYTES NFR BLD: 12.2 % (ref 22–41)
MCH RBC QN AUTO: 27.4 PG (ref 27–33)
MCH RBC QN AUTO: 27.5 PG (ref 27–33)
MCHC RBC AUTO-ENTMCNC: 32.3 G/DL (ref 33.6–35)
MCHC RBC AUTO-ENTMCNC: 32.7 G/DL (ref 33.6–35)
MCV RBC AUTO: 83.9 FL (ref 81.4–97.8)
MCV RBC AUTO: 85.3 FL (ref 81.4–97.8)
MONOCYTES # BLD AUTO: 0.61 K/UL (ref 0–0.85)
MONOCYTES NFR BLD AUTO: 7.7 % (ref 0–13.4)
NEUTROPHILS # BLD AUTO: 6.3 K/UL (ref 2–7.15)
NEUTROPHILS NFR BLD: 79.3 % (ref 44–72)
NRBC # BLD AUTO: 0 K/UL
NRBC BLD-RTO: 0 /100 WBC
PLATELET # BLD AUTO: 148 K/UL (ref 164–446)
PLATELET # BLD AUTO: 154 K/UL (ref 164–446)
PMV BLD AUTO: 11.3 FL (ref 9–12.9)
PMV BLD AUTO: 11.4 FL (ref 9–12.9)
POTASSIUM SERPL-SCNC: 4 MMOL/L (ref 3.6–5.5)
RBC # BLD AUTO: 4.07 M/UL (ref 4.2–5.4)
RBC # BLD AUTO: 4.09 M/UL (ref 4.2–5.4)
SODIUM SERPL-SCNC: 134 MMOL/L (ref 135–145)
WBC # BLD AUTO: 7.9 K/UL (ref 4.8–10.8)
WBC # BLD AUTO: 8.1 K/UL (ref 4.8–10.8)

## 2020-04-02 PROCEDURE — 700111 HCHG RX REV CODE 636 W/ 250 OVERRIDE (IP)

## 2020-04-02 PROCEDURE — 700111 HCHG RX REV CODE 636 W/ 250 OVERRIDE (IP): Performed by: STUDENT IN AN ORGANIZED HEALTH CARE EDUCATION/TRAINING PROGRAM

## 2020-04-02 PROCEDURE — A9270 NON-COVERED ITEM OR SERVICE: HCPCS | Performed by: STUDENT IN AN ORGANIZED HEALTH CARE EDUCATION/TRAINING PROGRAM

## 2020-04-02 PROCEDURE — 97161 PT EVAL LOW COMPLEX 20 MIN: CPT

## 2020-04-02 PROCEDURE — 700111 HCHG RX REV CODE 636 W/ 250 OVERRIDE (IP): Performed by: INTERNAL MEDICINE

## 2020-04-02 PROCEDURE — 770020 HCHG ROOM/CARE - TELE (206)

## 2020-04-02 PROCEDURE — 85730 THROMBOPLASTIN TIME PARTIAL: CPT

## 2020-04-02 PROCEDURE — 85025 COMPLETE CBC W/AUTO DIFF WBC: CPT

## 2020-04-02 PROCEDURE — 36415 COLL VENOUS BLD VENIPUNCTURE: CPT

## 2020-04-02 PROCEDURE — 700105 HCHG RX REV CODE 258: Performed by: STUDENT IN AN ORGANIZED HEALTH CARE EDUCATION/TRAINING PROGRAM

## 2020-04-02 PROCEDURE — 51798 US URINE CAPACITY MEASURE: CPT

## 2020-04-02 PROCEDURE — 700102 HCHG RX REV CODE 250 W/ 637 OVERRIDE(OP): Performed by: STUDENT IN AN ORGANIZED HEALTH CARE EDUCATION/TRAINING PROGRAM

## 2020-04-02 PROCEDURE — 80048 BASIC METABOLIC PNL TOTAL CA: CPT

## 2020-04-02 PROCEDURE — 85027 COMPLETE CBC AUTOMATED: CPT

## 2020-04-02 RX ORDER — ONDANSETRON 2 MG/ML
INJECTION INTRAMUSCULAR; INTRAVENOUS
Status: COMPLETED
Start: 2020-04-02 | End: 2020-04-02

## 2020-04-02 RX ORDER — ONDANSETRON 2 MG/ML
4 INJECTION INTRAMUSCULAR; INTRAVENOUS EVERY 4 HOURS PRN
Status: DISCONTINUED | OUTPATIENT
Start: 2020-04-02 | End: 2020-04-04 | Stop reason: HOSPADM

## 2020-04-02 RX ORDER — TRAMADOL HYDROCHLORIDE 50 MG/1
50 TABLET ORAL EVERY 6 HOURS PRN
Status: DISCONTINUED | OUTPATIENT
Start: 2020-04-02 | End: 2020-04-04 | Stop reason: HOSPADM

## 2020-04-02 RX ORDER — HYDROMORPHONE HYDROCHLORIDE 1 MG/ML
.5-1 INJECTION, SOLUTION INTRAMUSCULAR; INTRAVENOUS; SUBCUTANEOUS
Status: DISCONTINUED | OUTPATIENT
Start: 2020-04-02 | End: 2020-04-04 | Stop reason: HOSPADM

## 2020-04-02 RX ADMIN — SODIUM CHLORIDE, POTASSIUM CHLORIDE, SODIUM LACTATE AND CALCIUM CHLORIDE: 600; 310; 30; 20 INJECTION, SOLUTION INTRAVENOUS at 08:47

## 2020-04-02 RX ADMIN — HYDROMORPHONE HYDROCHLORIDE 1 MG: 1 INJECTION, SOLUTION INTRAMUSCULAR; INTRAVENOUS; SUBCUTANEOUS at 04:40

## 2020-04-02 RX ADMIN — SODIUM CHLORIDE, POTASSIUM CHLORIDE, SODIUM LACTATE AND CALCIUM CHLORIDE: 600; 310; 30; 20 INJECTION, SOLUTION INTRAVENOUS at 23:34

## 2020-04-02 RX ADMIN — HEPARIN SODIUM 950 UNITS/HR: 5000 INJECTION, SOLUTION INTRAVENOUS at 20:55

## 2020-04-02 RX ADMIN — SENNOSIDES AND DOCUSATE SODIUM 2 TABLET: 8.6; 5 TABLET ORAL at 05:47

## 2020-04-02 RX ADMIN — ONDANSETRON 4 MG: 2 INJECTION INTRAMUSCULAR; INTRAVENOUS at 04:24

## 2020-04-02 RX ADMIN — MORPHINE SULFATE 3 MG: 4 INJECTION INTRAVENOUS at 00:22

## 2020-04-02 RX ADMIN — ONDANSETRON 4 MG: 2 SOLUTION INTRAMUSCULAR; INTRAVENOUS at 04:24

## 2020-04-02 RX ADMIN — SENNOSIDES AND DOCUSATE SODIUM 2 TABLET: 8.6; 5 TABLET ORAL at 16:49

## 2020-04-02 ASSESSMENT — ENCOUNTER SYMPTOMS
FEVER: 0
CHILLS: 0
CONSTIPATION: 0
FOCAL WEAKNESS: 0
BACK PAIN: 1
FALLS: 0
SHORTNESS OF BREATH: 0
ABDOMINAL PAIN: 0
DOUBLE VISION: 0
SINUS PAIN: 0
COUGH: 0
BLURRED VISION: 0
HEADACHES: 1
DIARRHEA: 0

## 2020-04-02 ASSESSMENT — GAIT ASSESSMENTS
DEVIATION: BRADYKINETIC
DISTANCE (FEET): 110
GAIT LEVEL OF ASSIST: SUPERVISED
ASSISTIVE DEVICE: FRONT WHEEL WALKER

## 2020-04-02 ASSESSMENT — COGNITIVE AND FUNCTIONAL STATUS - GENERAL
MOVING FROM LYING ON BACK TO SITTING ON SIDE OF FLAT BED: A LITTLE
MOBILITY SCORE: 18
CLIMB 3 TO 5 STEPS WITH RAILING: A LITTLE
SUGGESTED CMS G CODE MODIFIER MOBILITY: CK
STANDING UP FROM CHAIR USING ARMS: A LITTLE
WALKING IN HOSPITAL ROOM: A LITTLE
MOVING TO AND FROM BED TO CHAIR: A LITTLE
TURNING FROM BACK TO SIDE WHILE IN FLAT BAD: A LITTLE

## 2020-04-02 ASSESSMENT — FIBROSIS 4 INDEX: FIB4 SCORE: 2.24

## 2020-04-02 NOTE — PROGRESS NOTES
IR RN note:     Bilateral pulmonary angiogram with EKOS catheter placement by MD Mckenzie       Sedation given per provider direction. Patient appeared to tolerate procedure, patient awake and talking post procedure.    Report given to CAN Gabriel, patient transported to [facility] via IR RN then transferred care.

## 2020-04-02 NOTE — PROGRESS NOTES
[facility] ICU Progress Note      Admit Date: 4/1/2020    Resident(s): Elinor Almendarez M.D.   Attending:  WINTER LÓPEZ/ Dr. Rush    Patient ID:    Name:  Lu Sands     YOB: 1945  Age:  75 y.o.  female   MRN:  [id number]    Hospital Course (carried forward and updated):  Lu Sands is a 75 y.o. female who presented to the ED on 4/1/2020 by EMS after she had a syncopal episode at [facility]. Upon EMS arrival, the patient was quite hypoxic. The patient was taken to [facility] and was found to have extensive bilateral pulmonary emboli extending to the main pulmonary artery. There was no saddle embolus. DVT ultrasound showed no DVT in the right lower extremity, but the patient had an acute DVT extending from the common femoral vein to the mid/distal posterior tibial and popliteal veins. The thrombosis was non-occlusive in the common femoral and popliteal veins. Thrombosis was occlusive, however, in the superficial femoral and popliteal veins. There was no superficial venous thrombosis. The patient had an Echo done which demonstrated evidence of Davey's sign suggestive of RV strain. The RVSP was estimated to be 50mmHg. LVEF was 65%.     The patient underwent EKOS by IR (Dr. Mckenzie) on 4/1/2020. Catheter has since been removed and the patient was transitioned to Heparin gtt per protocol.     One precipitating factor that may have caused the patient's DVT and subsequent PE is the patient's left hip arthroplasty done in January.     Consultants:  Critical Care  Interventional Radiology     Interval Events:  - No events overnight  - The patient is doing well this morning. She was using 2L of oxygen overnight, but is on RA during the day.   - She has right occipital hematoma and bruising underneath her right eye.   - CT head was negative for any acute process  - After discussion with case management, the patient will need to be transferred to [facility] due to her health insurance. However, [facility] is not accepting  "patients at this time. Therefore the patient will be transferred to the floor.  - If the patient's clinical course changes, or if pulmonology recommends, the patient can have a repeat CTA.     Review of Systems   Constitutional: Negative for chills and fever.   HENT: Negative for congestion and sinus pain.    Eyes: Negative for blurred vision and double vision.   Respiratory: Negative for cough and shortness of breath.    Cardiovascular: Negative for chest pain and leg swelling.   Gastrointestinal: Negative for abdominal pain, constipation and diarrhea.   Genitourinary: Negative for dysuria and urgency.   Musculoskeletal: Positive for back pain (Chronic). Negative for falls.   Neurological: Positive for headaches (Pain at the site of laceration on R. Occiptal side of head.). Negative for focal weakness.   All other systems reviewed and are negative.      PHYSICAL EXAM:  Vitals:    04/02/20 0700 04/02/20 0800 04/02/20 0900 04/02/20 1000   BP: 125/61 128/75 119/56 132/69   Pulse: 80 83 (!) 102 89   Resp: 18 18 (!) 43 12   Temp:  37.4 °C (99.4 °F)  37.4 °C (99.4 °F)   TempSrc:  Temporal  Temporal   SpO2:  93% (!) 78% 98%   Weight:       Height:        Body mass index is 27.27 kg/m².  Latest Vitals:  /69   Pulse 89   Temp 37.4 °C (99.4 °F) (Temporal)   Resp 12   Ht 1.702 m (5' 7.01\")   Wt 79 kg (174 lb 2.6 oz)   LMP 08/05/1989   SpO2 98%   Breastfeeding No   BMI 27.27 kg/m²   O2 therapy: Pulse Oximetry: 98 %, O2 (LPM): 1, O2 Delivery Device: None - Room Air  Vitals Range last 24h:  Temp:  [36.2 °C (97.2 °F)-37.5 °C (99.5 °F)] 37.4 °C (99.4 °F)  Pulse:  [] 89  Resp:  [12-43] 12  BP: (100-161)/(56-90) 132/69  SpO2:  [78 %-100 %] 98 %  Date 04/02/20 0700 - 04/03/20 0659   Shift 6448-2860 0386-7671 8238-4917 24 Hour Total   INTAKE   P.O. 120   120     P.O. 120   120   I.V. 384   384     Heparin Volume 84   84     Volume (mL) (lactated ringers infusion) 300   300   Shift Total 504   504   OUTPUT   Urine " 500   500     Number of Times Voided 1 x   1 x     Urine Void (mL) 500   500   Stool         Number of Times Stooled 0 x   0 x   Shift Total 500   500   NET 4   4        Intake/Output Summary (Last 24 hours) at 4/2/2020 1023  Last data filed at 4/2/2020 1000  Gross per 24 hour   Intake 1945.31 ml   Output 500 ml   Net 1445.31 ml        Physical Exam   Constitutional: She is oriented to person, place, and time and well-developed, well-nourished, and in no distress. No distress.   HENT:   Head: Normocephalic.   R. Occipital laceration  Bruising under R. eye   Eyes: Pupils are equal, round, and reactive to light. Conjunctivae and EOM are normal.   Cardiovascular: Normal rate, regular rhythm and normal heart sounds. Exam reveals no gallop and no friction rub.   No murmur heard.  Pulmonary/Chest: Breath sounds normal. No respiratory distress. She has no wheezes. She has no rales.   Abdominal: Soft. Bowel sounds are normal. She exhibits no distension. There is no abdominal tenderness.   Neurological: She is alert and oriented to person, place, and time. GCS score is 15.   Skin: Skin is warm and dry. She is not diaphoretic.   Psychiatric: Memory, affect and judgment normal.   Nursing note and vitals reviewed.        Recent Labs     04/01/20  1315 04/02/20  0430   SODIUM 139 134*   POTASSIUM 4.3 4.0   CHLORIDE 102 101   CO2 23 19*   BUN 18 15   CREATININE 0.67 0.46*   CALCIUM 9.3 8.8     Recent Labs     04/01/20  1315 04/02/20  0430   ALTSGPT 7  --    ASTSGOT 12  --    ALKPHOSPHAT 171*  --    TBILIRUBIN 0.6  --    GLUCOSE 123* 116*     Recent Labs     04/01/20  1144 04/01/20  2250 04/02/20  0035 04/02/20  0430 04/02/20  0745   RBC 4.65  --  4.09* 4.07*  --    HEMOGLOBIN 12.8  --  11.2* 11.2*  --    HEMATOCRIT 39.8  --  34.3* 34.7*  --    PLATELETCT 156*  --  154* 148*  --    PROTHROMBTM 14.1  --   --   --   --    APTT 25.2 29.7  --   --  102.3*   INR 1.07  --   --   --   --      Recent Labs     04/01/20  1144 04/01/20  1319  04/02/20  0035 04/02/20  0430   WBC 10.1  --  7.9 8.1   NEUTSPOLYS 70.30  --  79.30*  --    LYMPHOCYTES 19.70*  --  12.20*  --    MONOCYTES 7.10  --  7.70  --    EOSINOPHILS 2.00  --  0.00  --    BASOPHILS 0.50  --  0.40  --    ASTSGOT  --  12  --   --    ALTSGPT  --  7  --   --    ALKPHOSPHAT  --  171*  --   --    TBILIRUBIN  --  0.6  --   --        Meds:  • HYDROmorphone  0.5-1 mg     • ondansetron  4 mg     • tramadol  50 mg     • senna-docusate  2 Tab      And   • polyethylene glycol/lytes  1 Packet      And   • magnesium hydroxide  30 mL      And   • bisacodyl  10 mg     • LR   75 mL/hr at 04/02/20 0847   • acetaminophen  650 mg     • labetalol  10 mg     • MD Alert...HEPARIN WEIGHT BASED PROTOCOL Pharmacist to implement       • Respiratory Therapy Consult       • heparin  2,600 Units      And   • heparin   1,050 Units/hr (04/02/20 0830)        Procedures:  IR- EKOS on 4/1/2020    Imaging:  US-EXTREMITY VENOUS LOWER BILAT   Final Result      EC-ECHOCARDIOGRAM COMPLETE W/O CONT   Final Result      CT-CTA CHEST PULMONARY ARTERY W/ RECONS   Final Result         1. Extensive bilateral pulmonary emboli extending to the main pulmonary artery. No saddle embolus. No elevation of the RV/LV ratio.      2. No airspace opacity. No pleural effusion. No pneumothorax.      3. Cholelithiasis.      4. Heterogeneous sclerotic L1 vertebral body. Partially visualized sclerotic L3 vertebral body with some compression deformity. These are nonspecific and could be seen with Paget's disease. Metastasis cannot be entirely excluded. Further evaluation with    MRI lumbar spine and bone scan can be helpful.      CT-HEAD W/O   Final Result      1.  No evidence of acute intracranial process.      2.  Cerebral atrophy as well as periventricular chronic small vessel ischemic change.      IR-PULMONARY ANGIOGRAM-BILATERAL    (Results Pending)       Problem and Plan:  * Acute pulmonary embolism (HCC)  Assessment & Plan  - Presented with syncope  while walking, with preceding lightheadedness.  - CT PE shows widespread bilateral pulmonary embolism, as well ultrasound of left lower extremity shows acute DVT indicating likely high clot burden  - Not hemodynamically compromised on adnission, but did have elevated troponin, EKG and echo all show evidence of right heart strain with demand ischemia  - PE was likely provoked secondary to DVT of left lower extremity after left arthroplasty of the hip  - IR did EKOS on 4/1/2020 and catheter has since been removed.     Plan  - Continue heparin gtt. The patient will need to be either bridged to warfarin or started on a DOAC depending on her insurance.   - Patient was admitted to the ICU for close monitoring post-tPA/EKOS. She remains stable, therefore she will be transferred to the floor.   - If there is a change in the patient's respiratory status, or if pulmonology requests, can repeat CTA.     Acute respiratory failure with hypoxia (HCC)  Assessment & Plan  Improved.   - This was secondary to pulmonary embolism  - The patient is now on 2L of oxygen at night and on room air during the day    Traumatic hematoma of scalp  Assessment & Plan  The patient has a right occipital scalp hematoma and laceration as well as bruising under the right eye. This was secondary to the patient's syncopal episode.     Plan:  - Continue home tramadol for pain control    NSTEMI (non-ST elevated myocardial infarction) (HCC)  Assessment & Plan  - Improved.  - Likely type 2 NSTEMI. Troponin 188 on admission.  EKG did not show acute ST elevation or depression, or T wave changes.  - Likely secondary to demand ischemia of large pulmonary embolism    Syncope  Assessment & Plan  - Classic history consistent with diagnosis of pulmonary embolism  - Does not warrant secondary work-up of syncope    Chronic midline low back pain without sciatica  Assessment & Plan  Patient has chronic low back pain. CT scan showed a sclerotic lesion at L1 that may  indicate metastasis. The patient does not remember her last mammogram. There is a ?malignancy, but as per the patient, the L1 lesion has been worked up in the past.     Plan:  - The patient is on tramadol at home. We resumed home tramadol.     History of total left hip arthroplasty  Assessment & Plan  - Likely contributed to DVT which probably caused the pulmonary embolism      DISPO: Transfer to Floor.     CODE STATUS: FULL    Quality Measures:  Feeding: Healthy diet  Analgesia: Home tramadol  Sedation: None  Thromboprophylaxis: Heparin gtt.   Head of bed: >30 degrees  Ulcer prophylaxis: None  Glycemic control: None  Bowel care: bowel regimen  Indwelling lines: PIV  Deescalation of antibiotics: None      Elinor Almendarez M.D.

## 2020-04-02 NOTE — ASSESSMENT & PLAN NOTE
The patient has a right occipital scalp hematoma and laceration as well as bruising under the right eye. This was secondary to the patient's syncopal episode.   CT scan on admission did not reveal intracranial hemorrhage. No nausea, photophobia or lucid interval.  Pain control.

## 2020-04-02 NOTE — PROGRESS NOTES
Patient up from IR with All patient belongings. Patient educated on the importance of keeping right leg straight at all times.

## 2020-04-02 NOTE — OR SURGEON
Immediate Post- Operative Note        PostOp Diagnosis: submassive BILATERAL PE      Procedure(s): BILATERAL pulmonary angiogram and EKOS catheter placement for lysis      Estimated Blood Loss: Less than 5 ml        Complications: None            4/1/2020     5:40 PM     Ray Mckenzie M.D.

## 2020-04-02 NOTE — THERAPY
"Physical Therapy Evaluation completed.   Bed Mobility: Supervision    Transfers:  Supervision  Gait:  Supervision with Front-Wheel Walker       Plan of Care: Patient with no further skilled PT needs in the acute care setting at this time  Discharge Recommendations: Equipment: No Equipment Needed. Post-acute therapy Recommend outpatient physical therapy services to address higher level deficits.    Ms. Sands is a 76 y/o female who presents to acute secondary to syncopal episode, found to have acute PE, L LE DVT, and NSTEMI. Did hit her head during fall with abrasion. She had a recent DEX in January that she reports she had completely recovered from.  She presents with lower extremity strength that is equal bilaterally and WFL.  No sensation deficits. She was able to perform gait, transfers, and bed mobility without physical assist. Reported no dizziness, vitals WFL throughout. Has supportive family at home. Reviewed gentle ROM therex to perform with L LE. Pt demonstrated good understanding. As she is mobilizing at a supervision level of function, no additional acute PT needs. Recommend continue to ambulate with nursing staff to maintain current level of function.    See \"Rehab Therapy-Acute\" Patient Summary Report for complete documentation.     "

## 2020-04-02 NOTE — CARE PLAN
Problem: Communication  Goal: The ability to communicate needs accurately and effectively will improve  Outcome: PROGRESSING AS EXPECTED     Problem: Safety  Goal: Will remain free from injury  Outcome: PROGRESSING AS EXPECTED  Goal: Will remain free from falls  Outcome: PROGRESSING AS EXPECTED     Problem: Infection  Goal: Will remain free from infection  Outcome: PROGRESSING AS EXPECTED     Problem: Knowledge Deficit  Goal: Knowledge of disease process/condition, treatment plan, diagnostic tests, and medications will improve  Outcome: PROGRESSING AS EXPECTED     Problem: Pain Management  Goal: Pain level will decrease to patient's comfort goal  Outcome: PROGRESSING AS EXPECTED

## 2020-04-02 NOTE — PROGRESS NOTES
Late entry:   Pt experienced 2x sudden episodes of emesis with bile colored vomit with small amount of bloody flecks. Pt states that she has not eaten in a day and feels as though that contributed to the nausea and vomiting. Medicated per MAR.

## 2020-04-02 NOTE — PROGRESS NOTES
[facility] ICU Transfer Note                                                                                         ICU Admit Date: 4/1/2020       ICU Discharge Date: 4/2/2020        Primary Diagnosis:   Acute pulmonary embolism (HCC)        ICU Course Summary (Brief Narrative):  Lu Sands is a 75 y.o. female who presented to the ED on 4/1/2020 by EMS after she had a syncopal episode at [facility]. Upon EMS arrival, the patient was quite hypoxic. The patient was taken to [facility] and was found to have extensive bilateral pulmonary emboli extending to the main pulmonary artery. There was no saddle embolus. DVT ultrasound showed no DVT in the right lower extremity, but the patient had an acute DVT extending from the common femoral vein to the mid/distal posterior tibial and popliteal veins. The thrombosis was non-occlusive in the common femoral and popliteal veins. Thrombosis was occlusive, however, in the superficial femoral and popliteal veins. There was no superficial venous thrombosis. The patient had an Echo done which demonstrated evidence of Davey's sign suggestive of RV strain. The RVSP was estimated to be 50mmHg. LVEF was 65%.     The patient underwent EKOS by IR (Dr. Mckenzie) on 4/1/2020. Catheter has since been removed and the patient was transitioned to Heparin gtt per protocol.     One precipitating factor that may have caused the patient's DVT and subsequent PE is the patient's left hip arthroplasty done in January.     Important Events in the ICU with relevant dates:  - Central Line: None  - Intubation: None   - Pressors: None   - Khan catheter: None  - Tube feeding: None  - Antibiotics: None   - Other procedures: EKOS done 4/1/2020 by Dr. Mckenzie (IR)     Labs and imaging studies to be continued with their indications:  - CBC: Continue to monitor in case of bleeding  - Other studies: Can repeat CTA if the patient's clinical status deteriorates or does not continue improving.     Things to follow:  -  Currently on heparin gtt. The patient will need to be transitioned to DOAC or Warfarin based on insurance. DOAC would be preferred.       Elinor Almendarez M.D.

## 2020-04-02 NOTE — ASSESSMENT & PLAN NOTE
Secondary to acute pulmonary embolism  With ground-level fall and right scalp hematoma  Follow closely with heparin therapy

## 2020-04-02 NOTE — DISCHARGE PLANNING
· IP consult received   · RNCM verified with RADHA Moctezuma, that [facility] is out of network for Pt's insurance- [employer]  · RNCM discussed acute to acute transfer with Dr. Rush and Dr. Almendarez  · Per MD, Pt stable to transfer to in [facility], [place]  · RNCM spoke to [facility] RN, who will follow up with [place] and call this RNCM back  · Bedside RN updated

## 2020-04-02 NOTE — ASSESSMENT & PLAN NOTE
Patient has chronic low back pain. CT scan showed a sclerotic lesion at L1 that may indicate metastasis. The patient does not remember her last mammogram. There is a ?malignancy, but as per the patient, the L1 lesion has been worked up in the past.     Plan:  - The patient is on tramadol at home. We resumed home tramadol.

## 2020-04-02 NOTE — DISCHARGE PLANNING
Transfer Center:    Call received from BLANCA Treviño on unit.  Reported that [facility] informed her that patient has Katharine Neff.  [facility] is OON.  [facility] is preferred providers.      Call placed to [facility], [facility] @ [phone].  Spoke with Hema.  She reported per Incident Command Team facility is not accepting any transfers at this time.    Call placed to BLANCA Treviño.  Updated on status.    Plan:  TC remain available for any assistance.

## 2020-04-02 NOTE — PROGRESS NOTES
Pt tx onto unit by MAYTE Pearce RN. Report received at bedside. Tele box placed on pt and monitor room notified: SR 93. Pt denies pain. VSS. Call light and personal belongings within reach. Bed locked and in lowest position.

## 2020-04-02 NOTE — RESPIRATORY CARE
COPD EDUCATION by COPD CLINICAL EDUCATOR  4/2/2020 at 12:55 PM by Gardenia Pierre, RRT     Patient reviewed by COPD education team. Patient does not have a history or diagnosis of COPD and is a non-smoker, therefore does not qualify for the COPD program.

## 2020-04-02 NOTE — ASSESSMENT & PLAN NOTE
Risk is recent left hip arthroplasty January 20, 2020  Extensive DVT left lower extremity without proximal mobile thrombus  Bilateral clot burden noted on CTA chest  Presented with syncope and acute RV strain on echocardiogram, currently normotensive, consistent with high risk submassive pulmonary embolism  Avoid systemic thrombolysis given fall with head injury and over 70 years of age.  However would proceed with catheter directed thrombolytic therapy with low-dose alteplase, EKOS given significant RV strain and probable some improvement in 30-day mortality  Reviewed with IR, proceed with EKOS discussed with patient and monitor closely postop in ICU  Avoid excessive volume resuscitation  Post thrombolytic heparin protocol

## 2020-04-02 NOTE — DISCHARGE PLANNING
· RNCM received call back from [facility] RN, who stated [facility] is not accepting any new transfers at this time  · RNCM attempted to notify Pt, but Pt is sleeping  · RNCM notified bedside RN, bedside RN to inform Pt of above or let RNCM know when Pt awake  · RNCM notified Dr. Rush and Dr. Almendarez

## 2020-04-02 NOTE — PROGRESS NOTES
Pt appears to have new area of suborbital bruising to the right side. MD notified, no new orders at this time. Will continue to monitor.

## 2020-04-03 LAB
ANION GAP SERPL CALC-SCNC: 10 MMOL/L (ref 7–16)
APTT PPP: 67.6 SEC (ref 24.7–36)
BUN SERPL-MCNC: 9 MG/DL (ref 8–22)
CALCIUM SERPL-MCNC: 9 MG/DL (ref 8.5–10.5)
CHLORIDE SERPL-SCNC: 102 MMOL/L (ref 96–112)
CO2 SERPL-SCNC: 25 MMOL/L (ref 20–33)
CREAT SERPL-MCNC: 0.6 MG/DL (ref 0.5–1.4)
ERYTHROCYTE [DISTWIDTH] IN BLOOD BY AUTOMATED COUNT: 42.2 FL (ref 35.9–50)
GLUCOSE SERPL-MCNC: 108 MG/DL (ref 65–99)
HCT VFR BLD AUTO: 32.1 % (ref 37–47)
HGB BLD-MCNC: 10.5 G/DL (ref 12–16)
MCH RBC QN AUTO: 27.5 PG (ref 27–33)
MCHC RBC AUTO-ENTMCNC: 32.7 G/DL (ref 33.6–35)
MCV RBC AUTO: 84 FL (ref 81.4–97.8)
PLATELET # BLD AUTO: 144 K/UL (ref 164–446)
PMV BLD AUTO: 11.3 FL (ref 9–12.9)
POTASSIUM SERPL-SCNC: 4.3 MMOL/L (ref 3.6–5.5)
RBC # BLD AUTO: 3.82 M/UL (ref 4.2–5.4)
SODIUM SERPL-SCNC: 137 MMOL/L (ref 135–145)
WBC # BLD AUTO: 6.9 K/UL (ref 4.8–10.8)

## 2020-04-03 PROCEDURE — 770020 HCHG ROOM/CARE - TELE (206)

## 2020-04-03 PROCEDURE — 80048 BASIC METABOLIC PNL TOTAL CA: CPT

## 2020-04-03 PROCEDURE — 99233 SBSQ HOSP IP/OBS HIGH 50: CPT | Mod: GC | Performed by: INTERNAL MEDICINE

## 2020-04-03 PROCEDURE — 700102 HCHG RX REV CODE 250 W/ 637 OVERRIDE(OP): Performed by: STUDENT IN AN ORGANIZED HEALTH CARE EDUCATION/TRAINING PROGRAM

## 2020-04-03 PROCEDURE — A9270 NON-COVERED ITEM OR SERVICE: HCPCS | Performed by: STUDENT IN AN ORGANIZED HEALTH CARE EDUCATION/TRAINING PROGRAM

## 2020-04-03 PROCEDURE — 85027 COMPLETE CBC AUTOMATED: CPT

## 2020-04-03 PROCEDURE — 85730 THROMBOPLASTIN TIME PARTIAL: CPT

## 2020-04-03 PROCEDURE — 700105 HCHG RX REV CODE 258: Performed by: STUDENT IN AN ORGANIZED HEALTH CARE EDUCATION/TRAINING PROGRAM

## 2020-04-03 PROCEDURE — 36415 COLL VENOUS BLD VENIPUNCTURE: CPT

## 2020-04-03 RX ADMIN — APIXABAN 10 MG: 5 TABLET, FILM COATED ORAL at 10:37

## 2020-04-03 RX ADMIN — SENNOSIDES AND DOCUSATE SODIUM 2 TABLET: 8.6; 5 TABLET ORAL at 05:42

## 2020-04-03 RX ADMIN — APIXABAN 10 MG: 5 TABLET, FILM COATED ORAL at 16:49

## 2020-04-03 RX ADMIN — SODIUM CHLORIDE, POTASSIUM CHLORIDE, SODIUM LACTATE AND CALCIUM CHLORIDE: 600; 310; 30; 20 INJECTION, SOLUTION INTRAVENOUS at 13:20

## 2020-04-03 ASSESSMENT — ENCOUNTER SYMPTOMS
VOMITING: 0
HEADACHES: 0
MEMORY LOSS: 0
BACK PAIN: 1
WEAKNESS: 0
DEPRESSION: 0
CHILLS: 0
PND: 0
MYALGIAS: 0
ORTHOPNEA: 0
NAUSEA: 0
PALPITATIONS: 0
DOUBLE VISION: 0
BLURRED VISION: 0
DIARRHEA: 0
FEVER: 0
ABDOMINAL PAIN: 0
COUGH: 0
SHORTNESS OF BREATH: 0
DIZZINESS: 0

## 2020-04-03 ASSESSMENT — FIBROSIS 4 INDEX: FIB4 SCORE: 2.3

## 2020-04-03 NOTE — PROGRESS NOTES
"This morning while taking pt to the bathroom pt appeared to be hallucinating and stated \"I see a spider\" on the wall. Spider was not there. Page out to [facility] and made aware of the situation. Team come by and see pt.   "

## 2020-04-03 NOTE — CARE PLAN
Problem: Communication  Goal: The ability to communicate needs accurately and effectively will improve  Outcome: PROGRESSING AS EXPECTED   Pt A&Ox4; notifies staff of needs appropriately.     Problem: Safety  Goal: Will remain free from falls  Outcome: PROGRESSING AS EXPECTED   Fall precautions in place. Bed in lowest position. Non-skid socks in place. Personal possessions within reach. Mobility sign on door. Bed-alarm on. Call light within reach. Pt educated regarding fall prevention and states understanding.      Problem: Venous Thromboembolism (VTW)/Deep Vein Thrombosis (DVT) Prevention:  Goal: Patient will participate in Venous Thrombosis (VTE)/Deep Vein Thrombosis (DVT)Prevention Measures  Outcome: PROGRESSING AS EXPECTED   Pt on heparin gtt.    Problem: Knowledge Deficit  Goal: Knowledge of disease process/condition, treatment plan, diagnostic tests, and medications will improve  Outcome: PROGRESSING AS EXPECTED   Pt verbalizes understanding of POC; asks relevant questions.

## 2020-04-03 NOTE — PROGRESS NOTES
Assumed care of patient at bedside report from NOC RN. Updated on POC. Patient currently A & O x 4; on RA; up one assist; without complaints of acute pain. Call light within reach. Whiteboard updated. Fall precautions in place. Bed locked and in lowest position. All questions answered. No other needs indicated at this time.

## 2020-04-03 NOTE — PROGRESS NOTES
Report received from [facility]. Pt transferred from RICU today. Heparin drip running @ 950. Pt awake in bed A&Ox4; no complaints at this time. POC discussed; all questions answered. Bed locked in lowest position; call light in reach; bed alarm in use.

## 2020-04-04 VITALS
OXYGEN SATURATION: 96 % | TEMPERATURE: 99.3 F | RESPIRATION RATE: 18 BRPM | WEIGHT: 178.57 LBS | DIASTOLIC BLOOD PRESSURE: 89 MMHG | BODY MASS INDEX: 28.03 KG/M2 | HEIGHT: 67 IN | HEART RATE: 65 BPM | SYSTOLIC BLOOD PRESSURE: 140 MMHG

## 2020-04-04 LAB
ERYTHROCYTE [DISTWIDTH] IN BLOOD BY AUTOMATED COUNT: 40.5 FL (ref 35.9–50)
HCT VFR BLD AUTO: 32.1 % (ref 37–47)
HGB BLD-MCNC: 10.6 G/DL (ref 12–16)
MCH RBC QN AUTO: 27 PG (ref 27–33)
MCHC RBC AUTO-ENTMCNC: 33 G/DL (ref 33.6–35)
MCV RBC AUTO: 81.9 FL (ref 81.4–97.8)
PLATELET # BLD AUTO: 164 K/UL (ref 164–446)
PMV BLD AUTO: 11.2 FL (ref 9–12.9)
RBC # BLD AUTO: 3.92 M/UL (ref 4.2–5.4)
WBC # BLD AUTO: 7.1 K/UL (ref 4.8–10.8)

## 2020-04-04 PROCEDURE — 700105 HCHG RX REV CODE 258: Performed by: STUDENT IN AN ORGANIZED HEALTH CARE EDUCATION/TRAINING PROGRAM

## 2020-04-04 PROCEDURE — 36415 COLL VENOUS BLD VENIPUNCTURE: CPT

## 2020-04-04 PROCEDURE — 97165 OT EVAL LOW COMPLEX 30 MIN: CPT

## 2020-04-04 PROCEDURE — 700102 HCHG RX REV CODE 250 W/ 637 OVERRIDE(OP): Performed by: STUDENT IN AN ORGANIZED HEALTH CARE EDUCATION/TRAINING PROGRAM

## 2020-04-04 PROCEDURE — A9270 NON-COVERED ITEM OR SERVICE: HCPCS | Performed by: STUDENT IN AN ORGANIZED HEALTH CARE EDUCATION/TRAINING PROGRAM

## 2020-04-04 PROCEDURE — 99238 HOSP IP/OBS DSCHRG MGMT 30/<: CPT | Mod: GC | Performed by: INTERNAL MEDICINE

## 2020-04-04 PROCEDURE — 85027 COMPLETE CBC AUTOMATED: CPT

## 2020-04-04 RX ADMIN — APIXABAN 10 MG: 5 TABLET, FILM COATED ORAL at 05:53

## 2020-04-04 RX ADMIN — SODIUM CHLORIDE, POTASSIUM CHLORIDE, SODIUM LACTATE AND CALCIUM CHLORIDE: 600; 310; 30; 20 INJECTION, SOLUTION INTRAVENOUS at 04:30

## 2020-04-04 ASSESSMENT — PATIENT HEALTH QUESTIONNAIRE - PHQ9
2. FEELING DOWN, DEPRESSED, IRRITABLE, OR HOPELESS: NOT AT ALL
SUM OF ALL RESPONSES TO PHQ9 QUESTIONS 1 AND 2: 0
1. LITTLE INTEREST OR PLEASURE IN DOING THINGS: NOT AT ALL

## 2020-04-04 ASSESSMENT — LIFESTYLE VARIABLES
EVER_SMOKED: NEVER
TOTAL SCORE: 0
EVER HAD A DRINK FIRST THING IN THE MORNING TO STEADY YOUR NERVES TO GET RID OF A HANGOVER: NO
CONSUMPTION TOTAL: NEGATIVE
EVER FELT BAD OR GUILTY ABOUT YOUR DRINKING: NO
ON A TYPICAL DAY WHEN YOU DRINK ALCOHOL HOW MANY DRINKS DO YOU HAVE: 0
HAVE YOU EVER FELT YOU SHOULD CUT DOWN ON YOUR DRINKING: NO
ALCOHOL_USE: NO
AVERAGE NUMBER OF DAYS PER WEEK YOU HAVE A DRINK CONTAINING ALCOHOL: 0
DOES PATIENT WANT TO STOP DRINKING: NO
HOW MANY TIMES IN THE PAST YEAR HAVE YOU HAD 5 OR MORE DRINKS IN A DAY: 0
TOTAL SCORE: 0
HAVE PEOPLE ANNOYED YOU BY CRITICIZING YOUR DRINKING: NO
TOTAL SCORE: 0

## 2020-04-04 ASSESSMENT — COGNITIVE AND FUNCTIONAL STATUS - GENERAL
HELP NEEDED FOR BATHING: A LITTLE
DAILY ACTIVITIY SCORE: 23
SUGGESTED CMS G CODE MODIFIER DAILY ACTIVITY: CI

## 2020-04-04 ASSESSMENT — ACTIVITIES OF DAILY LIVING (ADL): TOILETING: INDEPENDENT

## 2020-04-04 NOTE — PROGRESS NOTES
Called [facility] pharmacy in [place] to ensure pt Eliquis prescription was sent in. It is not. Page out to [facility].

## 2020-04-04 NOTE — PROGRESS NOTES
Report received from Lazara. Pt increasingly confused today. Pt awake in bed A&Ox2-3; no complaints at this time. Decreased appetite. Heparin gtt d/c, pt now on Eliquis. POC discussed; all questions answered. Bed locked in lowest position; call light in reach; bed alarm in use.

## 2020-04-04 NOTE — DISCHARGE SUMMARY
Internal Medicine Discharge Summary  Note Author: Melchor Daily M.D.       Admit Date:  4/1/2020       Discharge Date:   4/4/2020    Service:   [facility] Internal Medicine BLUE Team  Attending Physician(s):        ARIADNA CONTRERAS  Senior Resident(s):   Kristian Tavarez  Ricardo Resident(s):   Maryann  PCP: Shivani Torres M.D.      Primary Diagnosis:   Acute pulmonary embolism with DVT    Secondary Diagnoses:                Principal Problem:    Acute pulmonary embolism (HCC) POA: Unknown  Active Problems:    Acute respiratory failure with hypoxia (HCC) POA: Unknown    Syncope POA: Unknown    NSTEMI (non-ST elevated myocardial infarction) (HCC) POA: Unknown    Traumatic hematoma of scalp POA: Unknown    History of total left hip arthroplasty POA: Unknown    Chronic midline low back pain without sciatica POA: Unknown      Hospital Summary (Brief Narrative):        Lu Sands is a 75 y.o. female who presented to the ED on 4/1/2020 by EMS after she had a syncopal episode at [facility]. Upon EMS arrival, the patient was quite hypoxic. The patient was taken to [facility] and was found to have extensive bilateral pulmonary emboli extending to the main pulmonary artery. There was no saddle embolus. DVT ultrasound showed an acute DVT extending from the common femoral vein to the mid/distal posterior tibial and popliteal veins. The thrombosis was non-occlusive in the common femoral and popliteal veins. The patient had an Echo done which demonstrated evidence of Davey's sign suggestive of RV strain. The RVSP was estimated to be 50mmHg. LVEF was 65% with no AS.     The patient underwent EKOS by IR (Dr. Mckenzie) on 4/1/2020. Catheter has since been removed and the patient was transitioned to Heparin gtt per protocol.      One precipitating factor that may have caused the patient's DVT and subsequent PE is the patient's left hip arthroplasty done in January.     The patient was transitioned from ICU to Tele floor,  patient continue improving and Heparin gtt was transitioned to Eliquis, Patient tolerated medication well.  We sent prescription to her pharmacy and was approved by her insurance (we double checked with the out side pharmacy).  Patient was seen and evaluated by PT/OT and was deemed to be ready to DC home safely.  Patient was able to walk around inside the hospital with no difficulty.    Patient DC home with the following recommendations:  - Please continue on Eliquis (10 mg twice daily for one week then continue on 5 mg twice daily)  - Make a follow up appointment with your PCP in one week  - Call your PCP or come back to ED if your condition get worse.    Patient agreed to the plan and thankful for our care.    Patient /Hospital Summary (Details -- Problem Oriented) :          Acute respiratory failure with hypoxia (HCC)  Assessment & Plan  Saturating well on room air today.  secondary to pulmonary embolism    * Acute pulmonary embolism (HCC)  Assessment & Plan  - Presented with syncope while shopping, with preceding lightheadedness.  - CT PE shows widespread bilateral pulmonary embolism, as well ultrasound of left lower extremity shows acute DVT indicating likely high clot burden  - Not hemodynamically compromised on adnission, but did have elevated troponin, EKG and echo all show evidence of right heart strain with demand ischemia  - PE was likely provoked secondary to DVT of left lower extremity after left arthroplasty of the hip  - IR did EKOS on 4/1/2020 and catheter has since been removed. Patient was stable, transferred to the floor.    Plan  - Heparin switched to Apixaban. Duration will be about 6 months.    Traumatic hematoma of scalp  Assessment & Plan  The patient has a right occipital scalp hematoma and laceration as well as bruising under the right eye. This was secondary to the patient's syncopal episode.   CT scan on admission did not reveal intracranial hemorrhage. No nausea, photophobia or lucid  interval.  Pain control.    NSTEMI (non-ST elevated myocardial infarction) (HCC)  Assessment & Plan  Due to demand ischemia due to right heart strain secondary to submassive pulmonary embolism.    Syncope  Assessment & Plan  - Classic history consistent with diagnosis of pulmonary embolism  - Does not warrant secondary work-up of syncope    Chronic midline low back pain without sciatica  Assessment & Plan  Patient has chronic low back pain. CT scan showed a sclerotic lesion at L1 that may indicate metastasis. The patient does not remember her last mammogram. There is a ?malignancy, but as per the patient, the L1 lesion has been worked up in the past.     Plan:  - The patient is on tramadol at home. We resumed home tramadol.     History of total left hip arthroplasty  Assessment & Plan  - Likely contributed to DVT which probably caused the pulmonary embolism      Consultants:     IR    Procedures:         EKOS done 4/1/2020 by Dr. Mckenzie (IR)    Imaging/ Testing:      IR-PULMONARY ANGIOGRAM-BILATERAL   Final Result      1. Occlusion of the RIGHT M1 segment.   2. Successful RIGHT middle cerebral artery mechanical thrombectomy using treatment stent.   3. Post thrombectomy arteriogram demonstrates patent lenticulostriate, RIGHT M1 and peripheral segments.         US-EXTREMITY VENOUS LOWER BILAT   Final Result      EC-ECHOCARDIOGRAM COMPLETE W/O CONT   Final Result      CT-CTA CHEST PULMONARY ARTERY W/ RECONS   Final Result         1. Extensive bilateral pulmonary emboli extending to the main pulmonary artery. No saddle embolus. No elevation of the RV/LV ratio.      2. No airspace opacity. No pleural effusion. No pneumothorax.      3. Cholelithiasis.      4. Heterogeneous sclerotic L1 vertebral body. Partially visualized sclerotic L3 vertebral body with some compression deformity. These are nonspecific and could be seen with Paget's disease. Metastasis cannot be entirely excluded. Further evaluation with    MRI lumbar  spine and bone scan can be helpful.      CT-HEAD W/O   Final Result      1.  No evidence of acute intracranial process.      2.  Cerebral atrophy as well as periventricular chronic small vessel ischemic change.            Discharge Medications:         Medication Reconciliation: Completed       Medication List      START taking these medications      Instructions   apixaban 5mg Tabs  Commonly known as:  ELIQUIS   Doctor's comments:  10 mg twice per day for one week THEN 5 mg twice daily  Take 2 Tabs by mouth 2 Times a Day. Indications: DVT/PE  Dose:  10 mg        CONTINUE taking these medications      Instructions   tizanidine 2 MG tablet  Commonly known as:  ZANAFLEX   TAKE 1 TABLET BY MOUTH EVERY DAY AT BEDTIME AS NEEDED FOR MUSCLE SPASMS     tramadol 50 MG Tabs  Commonly known as:  ULTRAM   Take 50 mg by mouth every 6 hours as needed.  FOR SEVERE PAIN  Dose:  50 mg            Can use .DISCHARGEMEDSLIST if going to another facility         Disposition:   Home    Diet:   Regular    Activity:   As tolerated    Instructions:      Please see above   The patient was instructed to return to the ER in the event of worsening symptoms. I have counseled the patient on the importance of compliance and the patient has agreed to proceed with all medical recommendations and follow up plan indicated above.   The patient understands that all medications come with benefits and risks. Risks may include permanent injury or death and these risks can be minimized with close reassessment and monitoring.        Primary Care Provider:    Shivani Torres M.D.    Discharge summary faxed to primary care provider:  Completed  Copy of discharge summary given to the patient: Completed      Time spent on discharge day patient visit, preparing discharge paperwork and arranging for patient follow up.      Discharge Time (Minutes) :    37 minutes  Hospital Course Type:  Inpatient Stay >2 midnights      Condition on Discharge    Patient discharged  home in stable medical condition______________________________________________________________________        Vitals:    04/03/20 2000 04/04/20 0000 04/04/20 0400 04/04/20 0814   BP: 148/84 156/88 150/84 154/86   Pulse: (!) 103 94 95 98   Resp: 16 16 16 16   Temp: 36.8 °C (98.3 °F) 37 °C (98.6 °F) 36.8 °C (98.3 °F) 36.8 °C (98.2 °F)   TempSrc: Temporal Temporal Temporal Temporal   SpO2: 96% 96% 97% 95%   Weight: 81 kg (178 lb 9.2 oz)      Height:         Weight/BMI: Body mass index is 27.96 kg/m².  Pulse Oximetry: 95 %, O2 (LPM): 0, O2 Delivery Device: None - Room Air    General: Not in acute distress  CVS: regular rate and rhythm  PULM: clear to ausculation and percussion bilaterally      Most Recent Labs:    Lab Results   Component Value Date/Time    WBC 7.1 04/04/2020 01:41 AM    RBC 3.92 (L) 04/04/2020 01:41 AM    HEMOGLOBIN 10.6 (L) 04/04/2020 01:41 AM    HEMATOCRIT 32.1 (L) 04/04/2020 01:41 AM    MCV 81.9 04/04/2020 01:41 AM    MCH 27.0 04/04/2020 01:41 AM    MCHC 33.0 (L) 04/04/2020 01:41 AM    MPV 11.2 04/04/2020 01:41 AM    NEUTSPOLYS 79.30 (H) 04/02/2020 12:35 AM    LYMPHOCYTES 12.20 (L) 04/02/2020 12:35 AM    MONOCYTES 7.70 04/02/2020 12:35 AM    EOSINOPHILS 0.00 04/02/2020 12:35 AM    BASOPHILS 0.40 04/02/2020 12:35 AM      Lab Results   Component Value Date/Time    SODIUM 137 04/03/2020 01:46 AM    POTASSIUM 4.3 04/03/2020 01:46 AM    CHLORIDE 102 04/03/2020 01:46 AM    CO2 25 04/03/2020 01:46 AM    GLUCOSE 108 (H) 04/03/2020 01:46 AM    BUN 9 04/03/2020 01:46 AM    CREATININE 0.60 04/03/2020 01:46 AM      Lab Results   Component Value Date/Time    ALTSGPT 7 04/01/2020 01:15 PM    ASTSGOT 12 04/01/2020 01:15 PM    ALKPHOSPHAT 171 (H) 04/01/2020 01:15 PM    TBILIRUBIN 0.6 04/01/2020 01:15 PM    ALBUMIN 4.3 04/01/2020 01:15 PM    GLOBULIN 3.3 04/01/2020 01:15 PM    INR 1.07 04/01/2020 11:44 AM     Lab Results   Component Value Date/Time    PROTHROMBTM 14.1 04/01/2020 11:44 AM    INR 1.07 04/01/2020 11:44  AM

## 2020-04-04 NOTE — CARE PLAN
Problem: Safety  Goal: Will remain free from falls  Outcome: PROGRESSING SLOWER THAN EXPECTED  Pt high fall risk; bed alarm in use; pt does not call prior to ambulating.    Problem: Knowledge Deficit  Goal: Knowledge of disease process/condition, treatment plan, diagnostic tests, and medications will improve  Outcome: PROGRESSING SLOWER THAN EXPECTED   Pt does not show evidence of understanding of POC; requires reorientation to place and event.    Problem: Skin Integrity  Goal: Risk for impaired skin integrity will decrease  Outcome: PROGRESSING AS EXPECTED   Pt has scabbing to back of head s/p fall; periorbital edema present R eye.

## 2020-04-04 NOTE — PROGRESS NOTES
Patient discharged home with . All personal belongings collected. IV access removed. Monitor removed, monitor room notified. Discharge instructions discussed. Medications reviewed; pt verbalized understanding. Follow up appointments discussed. Patient escorted off unit via wheelchair without incident.

## 2020-04-04 NOTE — THERAPY
"Occupational Therapy Evaluation completed.   Functional Status:  Pt presents to skilled OT services syncope, diagnosed with acute pulmonary embolism,LLE DVT NSTEMI and recent DEX in January. Pt was able to perform basic self care tasks, functional mobility and t/f's with supervision w/o AD and no lob noted, declined use of FWW. Pt reports she has all the required DME  at home including FWW, spouse able to assist as needed, declined OP and HH therapy.   Plan of Care: Patient with no further skilled OT needs in the acute care setting at this time  Discharge Recommendations:  Equipment: No Equipment Needed. Post-acute therapy Anticipate that the patient will have no further occupational therapy needs after discharge from the hospital.       See \"Rehab Therapy-Acute\" Patient Summary Report for complete documentation.    "

## 2020-04-04 NOTE — DISCHARGE INSTRUCTIONS
Discharge Instructions    Discharged to home by car with relative. Discharged via wheelchair, hospital escort: Yes.  Special equipment needed: Not Applicable    Be sure to schedule a follow-up appointment with your primary care doctor or any specialists as instructed.     Discharge Plan:   Diet Plan: Discussed  Activity Level: Discussed  Confirmed Follow up Appointment: Patient to Call and Schedule Appointment  Confirmed Symptoms Management: Discussed  Medication Reconciliation Updated: Yes    I understand that a diet low in cholesterol, fat, and sodium is recommended for good health. Unless I have been given specific instructions below for another diet, I accept this instruction as my diet prescription.   Other diet: Regular    Special Instructions:   Deep Vein Thrombosis Discharge Instructions    A deep vein thrombosis (DVT) is a blood clot (thrombus) that develops in a deep vein. A DVT is a clot in the deep, larger veins of the leg, arm, or pelvis. These are more dangerous than clots that might form in veins on the surface of the body. Deep vein thrombosis can lead to complications if the clot breaks off and travels in the bloodstream to the lungs.     CAUSES  Blood clots form in a vein for different reasons. Usually several things cause blood clots. They include:   · The flow of blood slows down.   · The inside of the vein is damaged in some way.   · The person has a condition that makes blood clot more easily. These conditions may include:  · Older age (especially over 75 years old).  · Having a history of blood clots.  · Having major or lengthy surgery. Hip surgery is particularly high-risk.   · Breaking a hip or leg.  · Sitting or lying still for a long time.  · Cancer or cancer treatment.  · Having a long, thin tube (catheter) placed inside a vein during a medical procedure.   · Being overweight (obese).  · Pregnancy and childbirth.  · Medicines with estrogen.  · Smoking.  · Other circulation or heart  problems.     SYMPTOMS  When a clot forms, it can either partially or totally block the blood flow in that vein. Symptoms of a DVT can include:  · Swelling of the leg or arm, especially if one side is much worse.  · Warmth and redness of the leg or arm, especially if one side is much worse.   · Pain in an arm or leg. If the clot is in the leg, symptoms may be more noticeable or worse when standing or walking.  If the blood clot travels to the lung, it may cause:  · Shortness of breath.  · Chest pain. The pain may be worsened by deep breaths.   · Coughing up thick mucus (phlegm), possibly flecked with blood.   Anyone with these symptoms should get emergency medical treatment right away. Call your local emergency  Services (911 in [place] if you have these symptoms.     DIAGNOSIS  If a DVT is suspected, your caregiver will take a full medical history. He or she will also perform a physical exam. Tests that also may be required include:   · Studies of the clotting properties of the blood.   · An ultrasound scan.   · X-rays to show the flow of blood when special dye is injected into the veins (venography).   · Studies of your lungs if you have any chest symptoms.     PREVENTION  · Exercise the legs regularly. Take a brisk 30 minute walk every day.   · Maintain a weight that is appropriate for your height.  · Avoid sitting or lying in bed for long periods of time without moving your legs.   · Women, particularly those over the age of 35, should consider the risks and benefits of taking estrogen medicine, including birth control pills.   · Do not smoke, especially if you take estrogen medicines.   · Long-distance travel can increase your risk. You should exercise your legs by walking or pumping the muscles every hour.   · In hospital prevention: Prevention may include medical and non medical measures.     TREATMENT  · The most common treatment for DVT is blood thinning (anticoagulant) medicine, which reduces the blood's  tendency to clot. Anticoagulants can stop new blood clots from forming and old ones from growing. They cannot dissolve existing clots. Your body does this by itself over time. Anticoagulants can be given by mouth, by intravenous (IV) access, or by injection. Your caregiver will determine the best program for you.   · Less commonly, clot-dissolving drugs (thrombolytics) are used to dissolve a DVT. They carry a high risk of bleeding, so they are used mainly in severe cases.   · Very rarely, a blood clot in the leg needs to be removed surgically.   · If you are unable to take anticoagulants, your caregiver may arrange for you to have a filter placed in a main vein in your belly (abdomen). This filter prevents clots from traveling to your lungs.     HOME CARE INSTRUCTIONS  Take all medicines prescribed by your caregiver. Follow the directions carefully.   · You will most likely continue taking anticoagulants after you leave the hospital. Your caregiver will advise you on the length of treatment (usually 3 to 5 months, sometimes for life).   · Taking too much or too little of an anticoagulant is dangerous. While taking this type of medicine, you will need to have regular blood tests to be sure the dose is correct. The dose can change for many reasons. It is critically important that you take this medicine exactly as prescribed, and that you have blood tests exactly as directed.   · Many foods can interfere with anticoagulants. These include foods high in vitamin K, such as spinach, kale, broccoli, cabbage, erika and turnip greens, [place] sprouts, peas, cauliflower, seaweed, parsley, beef and pork liver, green tea, and soybean oil. Your caregiver should discuss limits on these foods with you or you should arrange a visit with a dietician to answer your questions.   · Many medicines can interfere with anticoagulants. You must tell your caregiver about any and all medicines you take. This includes all vitamins and  supplements. Be especially cautious with aspirin and anti-inflammatory medicines. Ask your caregiver before taking these.   · Anticoagulants can have side effects, mostly excessive bruising or bleeding. You will need to hold pressure over cuts for longer than usual. Avoid alcoholic drinks or consume only very small amounts while taking this medicine.    If you are taking an anticoagulant:  · Wear a medical alert bracelet.  · Notify your dentist or other caregivers before procedures.  · Avoid contact sports.    · Ask your caregiver how soon you can go back to normal activities. Not being active can lead to new clots. Ask for a list of what you should and should not do.   · Exercise your lower leg muscles. This is important while traveling.   · You may need to wear compression stockings. These are tight elastic stockings that apply pressure to the lower legs. This can help keep the blood in the legs from clotting.   · If you are a smoker, you should quit.   · Learn as much as you can about DVT.     SEEK MEDICAL CARE IF:  · You have unusual bruising or any bleeding problems.  · The swelling or pain in your affected arm or leg is not gradually improving.   · You anticipate surgery or long-distance travel. You should get specific advice on DVT prevention.   · You discover other family members with blood clots. This may require further testing for inherited diseases or conditions.     SEEK IMMEDIATE MEDICAL CARE IF:  · You develop chest pain.  · You develop severe shortness of breath.  · You begin to cough up bloody mucus or phlegm (sputum).  · You feel dizzy or faint.   · You develop swelling or pain in the leg.  · You have breathing problems after traveling.    MAKE SURE YOU:  · Understand these instructions.  · Will watch your condition.  · Will get help right away if you are not doing well or get worse.       · Is patient discharged on Warfarin / Coumadin?   No       Pulmonary Embolism  A pulmonary embolism (PE) is a  sudden blockage or decrease of blood flow in one lung or both lungs. Most blockages come from a blood clot that travels from the legs or the pelvis to the lungs. PE is a dangerous and potentially life-threatening condition if it is not treated right away.  What are the causes?  A pulmonary embolism occurs most commonly when a blood clot travels from one of your veins to your lungs. Rarely, PE is caused by air, fat, amniotic fluid, or part of a tumor traveling through your veins to your lungs.  What increases the risk?  A PE is more likely to develop in:  · People who smoke.  · People who are older, especially over 60 years of age.  · People who are overweight (obese).  · People who sit or lie still for a long time, such as during long-distance travel (over 4 hours), bed rest, hospitalization, or during recovery from certain medical conditions like a stroke.  · People who do not engage in much physical activity (sedentary lifestyle).  · People who have chronic breathing disorders.  · People who have a personal or family history of blood clots or blood clotting disease.  · People who have peripheral vascular disease (PVD), diabetes, or some types of cancer.  · People who have heart disease, especially if the person had a recent heart attack or has congestive heart failure.  · People who have neurological diseases that affect the legs (leg paresis).  · People who have had a traumatic injury, such as breaking a hip or leg.  · People who have recently had major or lengthy surgery, especially on the hip, knee, or abdomen.  · People who have had a central line placed inside a large vein.  · People who take medicines that contain the hormone estrogen. These include birth control pills and hormone replacement therapy.  · Pregnancy or during childbirth or the postpartum period.  What are the signs or symptoms?  The symptoms of a PE usually start suddenly and include:  · Shortness of breath while active or at rest.  · Coughing  or coughing up blood or blood-tinged mucus.  · Chest pain that is often worse with deep breaths.  · Rapid or irregular heartbeat.  · Feeling light-headed or dizzy.  · Fainting.  · Feeling anxious.  · Sweating.  There may also be pain and swelling in a leg if that is where the blood clot started.  These symptoms may represent a serious problem that is an emergency. Do not wait to see if the symptoms will go away. Get medical help right away. Call your local emergency services (911 in the [place] Do not drive yourself to the hospital.   How is this diagnosed?  Your health care provider will take a medical history and perform a physical exam. You may also have other tests, including:  · Blood tests to assess the clotting properties of your blood, assess oxygen levels in your blood, and find blood clots.  · Imaging tests, such as CT, ultrasound, MRI, X-ray, and other tests to see if you have clots anywhere in your body.  · An electrocardiogram (ECG) to look for heart strain from blood clots in the lungs.  How is this treated?  The main goals of PE treatment are:  · To stop a blood clot from growing larger.  · To stop new blood clots from forming.  The type of treatment that you receive depends on many factors, such as the cause of your PE, your risk for bleeding or developing more clots, and other medical conditions that you have. Sometimes, a combination of treatments is necessary.  This condition may be treated with:  · Medicines, including newer oral blood thinners (anticoagulants), warfarin, low molecular weight heparins, thrombolytics, or heparins.  · Wearing compression stockings or using different types of devices.  · Surgery (rare) to remove the blood clot or to place a filter in your abdomen to stop the blood clot from traveling to your lungs.  Treatments for a PE are often divided into immediate treatment, long-term treatment (up to 3 months after PE), and extended treatment (more than 3 months after PE). Your  treatment may continue for several months. This is called maintenance therapy, and it is used to prevent the forming of new blood clots. You can work with your health care provider to choose the treatment program that is best for you.  What are anticoagulants?   Anticoagulants are medicines that treat PEs. They can stop current blood clots from growing and stop new clots from forming. They cannot dissolve existing clots. Your body dissolves clots by itself over time. Anticoagulants are given by mouth, by injection, or through an IV tube.  What are thrombolytics?   Thrombolytics are clot-dissolving medicines that are used to dissolve a PE. They carry a high risk of bleeding, so they tend to be used only in severe cases or if you have very low blood pressure.  Follow these instructions at home:  If you are taking a newer oral anticoagulant:  · Take the medicine every single day at the same time each day.  · Understand what foods and drugs interact with this medicine.  · Understand that there are no regular blood tests required when using this medicine.  · Understand the side effects of this medicine, including excessive bruising or bleeding. Ask your health care provider or pharmacist about other possible side effects.  If you are taking warfarin:  · Understand how to take warfarin and know which foods can affect how warfarin works in your body.  · Understand that it is dangerous to take too much or too little warfarin. Too much warfarin increases the risk of bleeding. Too little warfarin continues to allow the risk for blood clots.  · Follow your PT and INR blood testing schedule. The PT and INR results allow your health care provider to adjust your dose of warfarin. It is very important that you have your PT and INR tested as often as told by your health care provider.  · Avoid major changes in your diet, or tell your health care provider before you change your diet. Arrange a visit with a registered dietitian to  answer your questions. Many foods, especially foods that are high in vitamin K, can interfere with warfarin and affect the PT and INR results. Eat a consistent amount of foods that are high in vitamin K, such as:  ¨ Spinach, kale, broccoli, cabbage, erika greens, turnip greens, [place] sprouts, peas, cauliflower, seaweed, and parsley.  ¨ Beef liver and pork liver.  ¨ Green tea.  ¨ Soybean oil.  · Tell your health care provider about any and all medicines, vitamins, and supplements that you take, including aspirin and other over-the-counter anti-inflammatory medicines. Be especially cautious with aspirin and anti-inflammatory medicines. Do not take those before you ask your health care provider if it is safe to do so. This is important because many medicines can interfere with warfarin and affect the PT and INR results.  · Do not start or stop taking any over-the-counter or prescription medicine unless your health care provider or pharmacist tells you to do so.  If you take warfarin, you will also need to do these things:  · Hold pressure over cuts for longer than usual.  · Tell your dentist and other health care providers that you are taking warfarin before you have any procedures in which bleeding may occur.  · Avoid alcohol or drink very small amounts. Tell your health care provider if you change your alcohol intake.  · Do not use tobacco products, including cigarettes, chewing tobacco, and e-cigarettes. If you need help quitting, ask your health care provider.  · Avoid contact sports.  General instructions  · Take over-the-counter and prescription medicines only as told by your health care provider. Anticoagulant medicines can have side effects, including easy bruising and difficulty stopping bleeding. If you are prescribed an anticoagulant, you will also need to do these things:  ¨ Hold pressure over cuts for longer than usual.  ¨ Tell your dentist and other health care providers that you are taking  anticoagulants before you have any procedures in which bleeding may occur.  ¨ Avoid contact sports.  · Wear a medical alert bracelet or carry a medical alert card that says you have had a PE.  · Ask your health care provider how soon you can go back to your normal activities. Stay active to prevent new blood clots from forming.  · Make sure to exercise while traveling or when you have been sitting or standing for a long period of time. It is very important to exercise. Exercise your legs by walking or by tightening and relaxing your leg muscles often. Take frequent walks.  · Wear compression stockings as told by your health care provider to help prevent more blood clots from forming.  · Do not use tobacco products, including cigarettes, chewing tobacco, and e-cigarettes. If you need help quitting, ask your health care provider.  · Keep all follow-up appointments with your health care provider. This is important.  How is this prevented?  Take these actions to decrease your risk of developing another PE:  · Exercise regularly. For at least 30 minutes every day, engage in:  ¨ Activity that involves moving your arms and legs.  ¨ Activity that encourages good blood flow through your body by increasing your heart rate.  · Exercise your arms and legs every hour during long-distance travel (over 4 hours). Drink plenty of water and avoid drinking alcohol while traveling.  · Avoid sitting or lying in bed for long periods of time without moving your legs.  · Maintain a weight that is appropriate for your height. Ask your health care provider what weight is healthy for you.  · If you are a woman who is over 35 years of age, avoid unnecessary use of medicines that contain estrogen. These include birth control pills.  · Do not smoke, especially if you take estrogen medicines. If you need help quitting, ask your health care provider.  · If you are at very high risk for PE, wear compression stockings.  · If you recently had a PE,  have regularly scheduled ultrasound testing on your legs to check for new blood clots.  If you are hospitalized, prevention measures may include:  · Early walking after surgery, as soon as your health care provider says that it is safe.  · Receiving anticoagulants to prevent blood clots. If you cannot take anticoagulants, other options may be available, such as wearing compression stockings or using different types of devices.  Get help right away if:  · You have new or increased pain, swelling, or redness in an arm or leg.  · You have numbness or tingling in an arm or leg.  · You have shortness of breath while active or at rest.  · You have chest pain.  · You have a rapid or irregular heartbeat.  · You feel light-headed or dizzy.  · You cough up blood.  · You notice blood in your vomit, bowel movement, or urine.  · You have a fever.  These symptoms may represent a serious problem that is an emergency. Do not wait to see if the symptoms will go away. Get medical help right away. Call your local emergency services (911 in the [place] Do not drive yourself to the hospital.   This information is not intended to replace advice given to you by your health care provider. Make sure you discuss any questions you have with your health care provider.  Document Released: 12/15/2001 Document Revised: 05/25/2017 Document Reviewed: 04/13/2016  [employer] Patient Education © 2017 [employer]        Syncope  Introduction  Syncope is when you lose temporarily pass out (faint). Signs that you may be about to pass out include:  · Feeling dizzy or light-headed.  · Feeling sick to your stomach (nauseous).  · Seeing all white or all black.  · Having cold, clammy skin.  If you passed out, get help right away. Call your local emergency services (911 in the [place] Do not drive yourself to the hospital.  Follow these instructions at home:  Pay attention to any changes in your symptoms. Take these actions to help with your  condition:  · Have someone stay with you until you feel stable.  · Do not drive, use machinery, or play sports until your doctor says it is okay.  · Keep all follow-up visits as told by your doctor. This is important.  · If you start to feel like you might pass out, lie down right away and raise (elevate) your feet above the level of your heart. Breathe deeply and steadily. Wait until all of the symptoms are gone.  · Drink enough fluid to keep your pee (urine) clear or pale yellow.  · If you are taking blood pressure or heart medicine, get up slowly and spend many minutes getting ready to sit and then stand. This can help with dizziness.  · Take over-the-counter and prescription medicines only as told by your doctor.  Get help right away if:  · You have a very bad headache.  · You have unusual pain in your chest, tummy, or back.  · You are bleeding from your mouth or rectum.  · You have black or tarry poop (stool).  · You have a very fast or uneven heartbeat (palpitations).  · It hurts to breathe.  · You pass out once or more than once.  · You have jerky movements that you cannot control (seizure).  · You are confused.  · You have trouble walking.  · You are very weak.  · You have vision problems.  These symptoms may be an emergency. Do not wait to see if the symptoms will go away. Get medical help right away. Call your local emergency services (911 in the [place] Do not drive yourself to the hospital.   This information is not intended to replace advice given to you by your health care provider. Make sure you discuss any questions you have with your health care provider.  Document Released: 06/05/2009 Document Revised: 05/25/2017 Document Reviewed: 08/31/2016  © 2017 [employer]    Apixaban oral tablets  What is this medicine?  APIXABAN (a PIX a ban) is an anticoagulant (blood thinner). It is used to lower the chance of stroke in people with a medical condition called atrial fibrillation. It is also used to treat or  prevent blood clots in the lungs or in the veins.  This medicine may be used for other purposes; ask your health care provider or pharmacist if you have questions.  COMMON BRAND NAME(S): Eliquis  What should I tell my health care provider before I take this medicine?  They need to know if you have any of these conditions:  -bleeding disorders  -bleeding in the brain  -blood in your stools (black or tarry stools) or if you have blood in your vomit  -history of stomach bleeding  -kidney disease  -liver disease  -mechanical heart valve  -an unusual or allergic reaction to apixaban, other medicines, foods, dyes, or preservatives  -pregnant or trying to get pregnant  -breast-feeding  How should I use this medicine?  Take this medicine by mouth with a glass of water. Follow the directions on the prescription label. You can take it with or without food. If it upsets your stomach, take it with food. Take your medicine at regular intervals. Do not take it more often than directed. Do not stop taking except on your doctor's advice. Stopping this medicine may increase your risk of a blot clot. Be sure to refill your prescription before you run out of medicine.  Talk to your pediatrician regarding the use of this medicine in children. Special care may be needed.  Overdosage: If you think you have taken too much of this medicine contact a poison control center or emergency room at once.  NOTE: This medicine is only for you. Do not share this medicine with others.  What if I miss a dose?  If you miss a dose, take it as soon as you can. If it is almost time for your next dose, take only that dose. Do not take double or extra doses.  What may interact with this medicine?  This medicine may interact with the following:  -aspirin and aspirin-like medicines  -certain medicines for fungal infections like ketoconazole and itraconazole  -certain medicines for seizures like carbamazepine and phenytoin  -certain medicines that treat or  prevent blood clots like warfarin, enoxaparin, and dalteparin  -clarithromycin  -NSAIDs, medicines for pain and inflammation, like ibuprofen or naproxen  -rifampin  -ritonavir  -Marielos's wort  This list may not describe all possible interactions. Give your health care provider a list of all the medicines, herbs, non-prescription drugs, or dietary supplements you use. Also tell them if you smoke, drink alcohol, or use illegal drugs. Some items may interact with your medicine.  What should I watch for while using this medicine?  Visit your doctor or health care professional for regular checks on your progress.  Notify your doctor or health care professional and seek emergency treatment if you develop breathing problems; changes in vision; chest pain; severe, sudden headache; pain, swelling, warmth in the leg; trouble speaking; sudden numbness or weakness of the face, arm or leg. These can be signs that your condition has gotten worse.  If you are going to have surgery or other procedure, tell your doctor that you are taking this medicine.  What side effects may I notice from receiving this medicine?  Side effects that you should report to your doctor or health care professional as soon as possible:  -allergic reactions like skin rash, itching or hives, swelling of the face, lips, or tongue  -signs and symptoms of bleeding such as bloody or black, tarry stools; red or dark-brown urine; spitting up blood or brown material that looks like coffee grounds; red spots on the skin; unusual bruising or bleeding from the eye, gums, or nose  This list may not describe all possible side effects. Call your doctor for medical advice about side effects. You may report side effects to FDA at 7-728-FDA-8899.  Where should I keep my medicine?  Keep out of the reach of children.  Store at room temperature between 20 and 25 degrees C (68 and 77 degrees F). Throw away any unused medicine after the expiration date.  NOTE: This sheet is a  summary. It may not cover all possible information. If you have questions about this medicine, talk to your doctor, pharmacist, or health care provider.  © 2018 [employer]/Gold Standard (2017-07-10 11:54:23)    Depression / Suicide Risk    As you are discharged from this [facility] facility, it is important to learn how to keep safe from harming yourself.    Recognize the warning signs:  · Abrupt changes in personality, positive or negative- including increase in energy   · Giving away possessions  · Change in eating patterns- significant weight changes-  positive or negative  · Change in sleeping patterns- unable to sleep or sleeping all the time   · Unwillingness or inability to communicate  · Depression  · Unusual sadness, discouragement and loneliness  · Talk of wanting to die  · Neglect of personal appearance   · Rebelliousness- reckless behavior  · Withdrawal from people/activities they love  · Confusion- inability to concentrate     If you or a loved one observes any of these behaviors or has concerns about self-harm, here's what you can do:  · Talk about it- your feelings and reasons for harming yourself  · Remove any means that you might use to hurt yourself (examples: pills, rope, extension cords, firearm)  · Get professional help from the community (Mental Health, Substance Abuse, psychological counseling)  · Do not be alone:Call your Safe Contact- someone whom you trust who will be there for you.  · Call your local CRISIS HOTLINE 689-2572 or 248-812-8907  · Call your local Children's Mobile Crisis Response Team [place] (662) 501-1923 or www.[employer]  · Call the toll free National Suicide Prevention Hotlines   · National Suicide Prevention Lifeline 768-715-TGUG (6268)  · National Hope Line Network 800-SUICIDE (674-9533)

## 2021-01-15 DIAGNOSIS — Z23 NEED FOR VACCINATION: ICD-10-CM

## 2021-02-11 ENCOUNTER — HOSPITAL ENCOUNTER (OUTPATIENT)
Dept: LAB | Facility: MEDICAL CENTER | Age: 76
End: 2021-02-11
Attending: FAMILY MEDICINE
Payer: MEDICARE

## 2021-02-11 LAB
COVID ORDER STATUS COVID19: NORMAL
SARS-COV-2 RNA RESP QL NAA+PROBE: NOTDETECTED
SPECIMEN SOURCE: NORMAL

## 2021-02-11 PROCEDURE — U0005 INFEC AGEN DETEC AMPLI PROBE: HCPCS

## 2021-02-11 PROCEDURE — U0003 INFECTIOUS AGENT DETECTION BY NUCLEIC ACID (DNA OR RNA); SEVERE ACUTE RESPIRATORY SYNDROME CORONAVIRUS 2 (SARS-COV-2) (CORONAVIRUS DISEASE [COVID-19]), AMPLIFIED PROBE TECHNIQUE, MAKING USE OF HIGH THROUGHPUT TECHNOLOGIES AS DESCRIBED BY CMS-2020-01-R: HCPCS

## 2021-02-11 PROCEDURE — C9803 HOPD COVID-19 SPEC COLLECT: HCPCS

## 2021-02-16 ENCOUNTER — IMMUNIZATION (OUTPATIENT)
Dept: FAMILY PLANNING/WOMEN'S HEALTH CLINIC | Facility: IMMUNIZATION CENTER | Age: 76
End: 2021-02-16
Payer: MEDICARE

## 2021-02-16 DIAGNOSIS — Z23 ENCOUNTER FOR VACCINATION: Primary | ICD-10-CM

## 2021-02-16 PROCEDURE — 91300 PFIZER SARS-COV-2 VACCINE: CPT

## 2021-02-16 PROCEDURE — 0001A PFIZER SARS-COV-2 VACCINE: CPT

## 2021-03-13 ENCOUNTER — IMMUNIZATION (OUTPATIENT)
Dept: FAMILY PLANNING/WOMEN'S HEALTH CLINIC | Facility: IMMUNIZATION CENTER | Age: 76
End: 2021-03-13
Attending: INTERNAL MEDICINE
Payer: MEDICARE

## 2021-03-13 DIAGNOSIS — Z23 ENCOUNTER FOR VACCINATION: Primary | ICD-10-CM

## 2021-03-13 PROCEDURE — 91300 PFIZER SARS-COV-2 VACCINE: CPT

## 2021-03-13 PROCEDURE — 0002A PFIZER SARS-COV-2 VACCINE: CPT

## 2021-08-04 NOTE — PROGRESS NOTES
Daily Progress Note:     Date of Service: 4/3/2020  Primary Team: [employer] Blue Team   Attending: JEISON Cervantes   Senior Resident: Dr. Daily  Intern: Dr. Wolf  Contact:  [phone]    Chief Complaint:   Syncope    ID: 75 years old female history of hip arthroplasty in January, admitted for bilateral submassive pulmonary embolism. Received catheter thrombolysis and started on heparin gtt and transferred to the floor from ICU. Heparin was switched to apixaban for 6 months duration of therapy.    Interval update:  4/3/2020: She is saturating well on room air, asymptomatic. Heparin gtt switched to apixaban.    Subjective: Transferred from ICU yesterday. No overnight events. No shortness of breath or chest pain / palpitations today    Consultants/Specialty:  None    Review of Systems:    Review of Systems   Constitutional: Negative for chills and fever.   Eyes: Negative for blurred vision and double vision.   Respiratory: Negative for cough and shortness of breath.    Cardiovascular: Negative for chest pain, palpitations, orthopnea, leg swelling and PND.   Gastrointestinal: Negative for abdominal pain, diarrhea, nausea and vomiting.   Genitourinary: Negative for dysuria.   Musculoskeletal: Positive for back pain. Negative for myalgias.   Skin: Negative for rash.   Neurological: Negative for dizziness, weakness and headaches.   Psychiatric/Behavioral: Negative for depression and memory loss.       Objective Data:   Physical Exam:   Vitals:   Temp:  [36.6 °C (97.9 °F)-37.7 °C (99.8 °F)] 37.2 °C (98.9 °F)  Pulse:  [] 106  Resp:  [12-38] 18  BP: (121-152)/(66-81) 140/76  SpO2:  [90 %-97 %] 97 %    Physical Exam  Constitutional:       General: She is not in acute distress.     Appearance: She is not ill-appearing.   HENT:      Head:      Comments: Echymosis on left eye and neck.     Right Ear: External ear normal.      Left Ear: External ear normal.      Nose: No rhinorrhea.   Eyes:      Extraocular  Movements: Extraocular movements intact.      Pupils: Pupils are equal, round, and reactive to light.   Neck:      Musculoskeletal: Neck supple.   Cardiovascular:      Rate and Rhythm: Normal rate and regular rhythm.      Pulses: Normal pulses.      Heart sounds: No murmur.   Pulmonary:      Effort: No respiratory distress.      Breath sounds: Normal breath sounds. No wheezing, rhonchi or rales.   Abdominal:      General: There is no distension.      Palpations: Abdomen is soft.      Tenderness: There is no abdominal tenderness. There is no guarding or rebound.   Musculoskeletal:      Right lower leg: No edema.      Left lower leg: No edema.   Skin:     Findings: No rash.   Neurological:      Mental Status: She is oriented to person, place, and time.           Labs:   ===========================  Recent Labs     04/01/20  1144  04/02/20  0035 04/02/20  0430  04/02/20  1315 04/02/20  1921 04/03/20  0146   RBC 4.65  --  4.09* 4.07*  --   --   --  3.82*   HEMOGLOBIN 12.8  --  11.2* 11.2*  --   --   --  10.5*   HEMATOCRIT 39.8  --  34.3* 34.7*  --   --   --  32.1*   PLATELETCT 156*  --  154* 148*  --   --   --  144*   PROTHROMBTM 14.1  --   --   --   --   --   --   --    APTT 25.2   < >  --   --    < > 98.3* 74.4* 67.6*   INR 1.07  --   --   --   --   --   --   --     < > = values in this interval not displayed.     Recent Labs     04/01/20  1315 04/02/20  0430 04/03/20  0146   SODIUM 139 134* 137   POTASSIUM 4.3 4.0 4.3   CHLORIDE 102 101 102   CO2 23 19* 25   BUN 18 15 9   CREATININE 0.67 0.46* 0.60   CALCIUM 9.3 8.8 9.0     Recent Labs     04/01/20  1315 04/02/20  0430 04/03/20  0146   ALTSGPT 7  --   --    ASTSGOT 12  --   --    ALKPHOSPHAT 171*  --   --    TBILIRUBIN 0.6  --   --    GLUCOSE 123* 116* 108*     Recent Labs     04/01/20  1144  04/02/20  1315 04/02/20  1921 04/03/20  0146   APTT 25.2   < > 98.3* 74.4* 67.6*   INR 1.07  --   --   --   --     < > = values in this interval not displayed.     Recent Labs      04/01/20  1315   CPKTOTAL 49         Recent Labs     04/01/20  1144 04/01/20  1315 04/02/20  0035 04/02/20  0430 04/03/20  0146   WBC 10.1  --  7.9 8.1 6.9   NEUTSPOLYS 70.30  --  79.30*  --   --    LYMPHOCYTES 19.70*  --  12.20*  --   --    MONOCYTES 7.10  --  7.70  --   --    EOSINOPHILS 2.00  --  0.00  --   --    BASOPHILS 0.50  --  0.40  --   --    ASTSGOT  --  12  --   --   --    ALTSGPT  --  7  --   --   --    ALKPHOSPHAT  --  171*  --   --   --    TBILIRUBIN  --  0.6  --   --   --          ===========================    Imaging:   No imaging today.    * Acute pulmonary embolism (HCC)  Assessment & Plan  - Presented with syncope while shopping, with preceding lightheadedness.  - CT PE shows widespread bilateral pulmonary embolism, as well ultrasound of left lower extremity shows acute DVT indicating likely high clot burden  - Not hemodynamically compromised on adnission, but did have elevated troponin, EKG and echo all show evidence of right heart strain with demand ischemia  - PE was likely provoked secondary to DVT of left lower extremity after left arthroplasty of the hip  - IR did EKOS on 4/1/2020 and catheter has since been removed. Patient was stable, transferred to the floor.    Plan  - Heparin switched to Apixaban. Duration will be about 6 months.    Acute respiratory failure with hypoxia ([facility])  Assessment & Plan  Saturating well on room air today.  secondary to pulmonary embolism    Traumatic hematoma of scalp  Assessment & Plan  The patient has a right occipital scalp hematoma and laceration as well as bruising under the right eye. This was secondary to the patient's syncopal episode.   CT scan on admission did not reveal intracranial hemorrhage. No nausea, photophobia or lucid interval.  Pain control.    NSTEMI (non-ST elevated myocardial infarction) ([facility])  Assessment & Plan  Due to demand ischemia due to right heart strain secondary to submassive pulmonary embolism.    Syncope  Assessment &  Plan  - Classic history consistent with diagnosis of pulmonary embolism  - Does not warrant secondary work-up of syncope    Chronic midline low back pain without sciatica  Assessment & Plan  Patient has chronic low back pain. CT scan showed a sclerotic lesion at L1 that may indicate metastasis. The patient does not remember her last mammogram. There is a ?malignancy, but as per the patient, the L1 lesion has been worked up in the past.     Plan:  - The patient is on tramadol at home. We resumed home tramadol.     History of total left hip arthroplasty  Assessment & Plan  - Likely contributed to DVT which probably caused the pulmonary embolism       Post-Care Instructions: I reviewed with the patient in detail post-care instructions. Patient is not to engage in any heavy lifting, exercise, or swimming for the next 14 days. Should the patient develop any fevers, chills, bleeding, severe pain patient will contact the office immediately.